# Patient Record
Sex: MALE | URBAN - METROPOLITAN AREA
[De-identification: names, ages, dates, MRNs, and addresses within clinical notes are randomized per-mention and may not be internally consistent; named-entity substitution may affect disease eponyms.]

---

## 2023-05-02 ENCOUNTER — TRANSCRIBE ORDERS (OUTPATIENT)
Dept: ADMINISTRATIVE | Age: 63
End: 2023-05-02

## 2023-05-02 DIAGNOSIS — R29.898 HEAVY SENSATION OF LOWER EXTREMITY: Primary | ICD-10-CM

## 2023-05-23 ENCOUNTER — HOSPITAL ENCOUNTER (OUTPATIENT)
Dept: VASCULAR LAB | Age: 63
Discharge: HOME | End: 2023-05-23
Payer: MEDICAID

## 2023-05-23 DIAGNOSIS — R29.898 HEAVY SENSATION OF LOWER EXTREMITY: ICD-10-CM

## 2023-05-23 PROCEDURE — 93925 LOWER EXTREMITY STUDY: CPT

## 2023-06-02 ENCOUNTER — HOSPITAL ENCOUNTER (OUTPATIENT)
Age: 63
Discharge: HOME | End: 2023-06-02
Payer: MEDICAID

## 2023-06-02 ENCOUNTER — HOSPITAL ENCOUNTER (OUTPATIENT)
Dept: GENERAL RADIOLOGY | Age: 63
Discharge: HOME | End: 2023-06-02
Payer: MEDICAID

## 2023-06-02 DIAGNOSIS — R20.0 BILATERAL LEG NUMBNESS: ICD-10-CM

## 2023-06-02 PROCEDURE — 72100 X-RAY EXAM L-S SPINE 2/3 VWS: CPT

## 2023-06-05 ENCOUNTER — OFFICE VISIT (OUTPATIENT)
Dept: FAMILY MEDICINE CLINIC | Age: 63
End: 2023-06-05
Payer: MEDICAID

## 2023-06-05 VITALS
DIASTOLIC BLOOD PRESSURE: 76 MMHG | HEIGHT: 69 IN | HEART RATE: 65 BPM | BODY MASS INDEX: 26.93 KG/M2 | WEIGHT: 181.8 LBS | OXYGEN SATURATION: 97 % | SYSTOLIC BLOOD PRESSURE: 126 MMHG

## 2023-06-05 DIAGNOSIS — M1A.0720 IDIOPATHIC CHRONIC GOUT OF LEFT FOOT WITHOUT TOPHUS: ICD-10-CM

## 2023-06-05 DIAGNOSIS — R22.41 LOCALIZED SWELLING OF RIGHT LOWER LEG: ICD-10-CM

## 2023-06-05 DIAGNOSIS — R07.9 CHEST PAIN, UNSPECIFIED TYPE: ICD-10-CM

## 2023-06-05 DIAGNOSIS — E55.9 VITAMIN D DEFICIENCY: ICD-10-CM

## 2023-06-05 DIAGNOSIS — K21.9 GASTROESOPHAGEAL REFLUX DISEASE, UNSPECIFIED WHETHER ESOPHAGITIS PRESENT: ICD-10-CM

## 2023-06-05 DIAGNOSIS — Z59.00 HOMELESSNESS: ICD-10-CM

## 2023-06-05 DIAGNOSIS — I25.118 CORONARY ARTERY DISEASE OF NATIVE ARTERY OF NATIVE HEART WITH STABLE ANGINA PECTORIS (HCC): ICD-10-CM

## 2023-06-05 DIAGNOSIS — R41.3 MEMORY PROBLEM: Primary | ICD-10-CM

## 2023-06-05 DIAGNOSIS — Z78.9 ALCOHOL USE: ICD-10-CM

## 2023-06-05 DIAGNOSIS — Z95.5 H/O HEART ARTERY STENT: ICD-10-CM

## 2023-06-05 DIAGNOSIS — E78.2 MIXED HYPERLIPIDEMIA: ICD-10-CM

## 2023-06-05 PROCEDURE — 99204 OFFICE O/P NEW MOD 45 MIN: CPT | Performed by: STUDENT IN AN ORGANIZED HEALTH CARE EDUCATION/TRAINING PROGRAM

## 2023-06-05 RX ORDER — CARVEDILOL 3.12 MG/1
3.12 TABLET ORAL 2 TIMES DAILY
COMMUNITY
Start: 2023-05-30

## 2023-06-05 RX ORDER — LANOLIN ALCOHOL/MO/W.PET/CERES
100 CREAM (GRAM) TOPICAL DAILY
COMMUNITY

## 2023-06-05 RX ORDER — ERGOCALCIFEROL 1.25 MG/1
50000 CAPSULE ORAL
COMMUNITY
End: 2023-06-05 | Stop reason: SDUPTHER

## 2023-06-05 RX ORDER — ERGOCALCIFEROL 1.25 MG/1
50000 CAPSULE ORAL
Qty: 4 CAPSULE | Status: CANCELLED | OUTPATIENT
Start: 2023-06-05

## 2023-06-05 RX ORDER — PANTOPRAZOLE SODIUM 40 MG/1
40 TABLET, DELAYED RELEASE ORAL 2 TIMES DAILY
COMMUNITY

## 2023-06-05 RX ORDER — ATORVASTATIN CALCIUM 10 MG/1
10 TABLET, FILM COATED ORAL DAILY
COMMUNITY
Start: 2023-05-08

## 2023-06-05 RX ORDER — ASPIRIN 81 MG/1
81 TABLET, COATED ORAL 2 TIMES DAILY
COMMUNITY
Start: 2023-05-30

## 2023-06-05 RX ORDER — ERGOCALCIFEROL 1.25 MG/1
50000 CAPSULE ORAL
Qty: 4 CAPSULE | Refills: 2 | Status: SHIPPED | OUTPATIENT
Start: 2023-06-05

## 2023-06-05 RX ORDER — ALLOPURINOL 100 MG/1
100 TABLET ORAL DAILY
COMMUNITY
Start: 2023-05-08

## 2023-06-05 SDOH — ECONOMIC STABILITY: FOOD INSECURITY: WITHIN THE PAST 12 MONTHS, THE FOOD YOU BOUGHT JUST DIDN'T LAST AND YOU DIDN'T HAVE MONEY TO GET MORE.: OFTEN TRUE

## 2023-06-05 SDOH — ECONOMIC STABILITY - HOUSING INSECURITY: HOMELESSNESS UNSPECIFIED: Z59.00

## 2023-06-05 SDOH — ECONOMIC STABILITY: HOUSING INSECURITY
IN THE LAST 12 MONTHS, WAS THERE A TIME WHEN YOU DID NOT HAVE A STEADY PLACE TO SLEEP OR SLEPT IN A SHELTER (INCLUDING NOW)?: YES

## 2023-06-05 SDOH — ECONOMIC STABILITY: INCOME INSECURITY: HOW HARD IS IT FOR YOU TO PAY FOR THE VERY BASICS LIKE FOOD, HOUSING, MEDICAL CARE, AND HEATING?: VERY HARD

## 2023-06-05 SDOH — ECONOMIC STABILITY: FOOD INSECURITY: WITHIN THE PAST 12 MONTHS, YOU WORRIED THAT YOUR FOOD WOULD RUN OUT BEFORE YOU GOT MONEY TO BUY MORE.: OFTEN TRUE

## 2023-06-05 ASSESSMENT — PATIENT HEALTH QUESTIONNAIRE - PHQ9
3. TROUBLE FALLING OR STAYING ASLEEP: 3
SUM OF ALL RESPONSES TO PHQ9 QUESTIONS 1 & 2: 6
SUM OF ALL RESPONSES TO PHQ QUESTIONS 1-9: 18
2. FEELING DOWN, DEPRESSED OR HOPELESS: 3
7. TROUBLE CONCENTRATING ON THINGS, SUCH AS READING THE NEWSPAPER OR WATCHING TELEVISION: 3
5. POOR APPETITE OR OVEREATING: 0
6. FEELING BAD ABOUT YOURSELF - OR THAT YOU ARE A FAILURE OR HAVE LET YOURSELF OR YOUR FAMILY DOWN: 3
10. IF YOU CHECKED OFF ANY PROBLEMS, HOW DIFFICULT HAVE THESE PROBLEMS MADE IT FOR YOU TO DO YOUR WORK, TAKE CARE OF THINGS AT HOME, OR GET ALONG WITH OTHER PEOPLE: 3
9. THOUGHTS THAT YOU WOULD BE BETTER OFF DEAD, OR OF HURTING YOURSELF: 0
4. FEELING TIRED OR HAVING LITTLE ENERGY: 3
SUM OF ALL RESPONSES TO PHQ QUESTIONS 1-9: 18
SUM OF ALL RESPONSES TO PHQ QUESTIONS 1-9: 18
1. LITTLE INTEREST OR PLEASURE IN DOING THINGS: 3
SUM OF ALL RESPONSES TO PHQ QUESTIONS 1-9: 18
8. MOVING OR SPEAKING SO SLOWLY THAT OTHER PEOPLE COULD HAVE NOTICED. OR THE OPPOSITE, BEING SO FIGETY OR RESTLESS THAT YOU HAVE BEEN MOVING AROUND A LOT MORE THAN USUAL: 0

## 2023-06-05 ASSESSMENT — ENCOUNTER SYMPTOMS
BLOOD IN STOOL: 0
RHINORRHEA: 0
SHORTNESS OF BREATH: 0
COUGH: 0

## 2023-06-05 NOTE — PROGRESS NOTES
[facility]  Establish care visit   2023    Keara Carrillo (:  1960) is a 63 y.o. male, here to establish care. Chief Complaint   Patient presents with    New Patient     Would like to establish care         ASSESSMENT/ PLAN  1. Memory problem  -Labs to rule out delirium. Referral to neurology for further evaluation and treatment. Patient has a history of alcohol abuse in the past and a history of thiamine deficiency. He has been taking thiamine. We will recheck Rosie Cool [facility], [facility]  - CBC with Auto Differential; Future  - Comprehensive Metabolic Panel; Future  - TSH with Reflex; Future  - LIPID PANEL; Future  - VITAMIN B1; Future  - Vitamin D 25 Hydroxy; Future  - Hemoglobin A1C; Future    2. H/O heart artery stent  -Referral to cardiology  - [facility]  - CBC with Auto Differential; Future  - Comprehensive Metabolic Panel; Future  - TSH with Reflex; Future  - LIPID PANEL; Future  - VITAMIN B1; Future  - Vitamin D 25 Hydroxy; Future  - Hemoglobin A1C; Future    3. Coronary artery disease of native artery of native heart with stable angina pectoris [facility])  -Referral to cardiology, continue beta-blocker, ASA, and Brilinta. - [facility]  - CBC with Auto Differential; Future  - Comprehensive Metabolic Panel; Future  - TSH with Reflex; Future  - LIPID PANEL; Future  - VITAMIN B1; Future  - Vitamin D 25 Hydroxy; Future  - Hemoglobin A1C; Future    4. Chest pain, unspecified type  -Referral to cardiology. No current chest pain. He does have chest pain with minimal exertion.  - [facility]  - CBC with Auto Differential; Future  - Comprehensive Metabolic Panel; Future  - TSH with Reflex; Future  - LIPID PANEL; Future  - VITAMIN B1; Future  - Vitamin D 25 Hydroxy; Future  - Hemoglobin A1C; Future    5.

## 2023-06-06 ENCOUNTER — CLINICAL DOCUMENTATION (OUTPATIENT)
Dept: SPIRITUAL SERVICES | Age: 63
End: 2023-06-06

## 2023-06-19 ENCOUNTER — OFFICE VISIT (OUTPATIENT)
Dept: FAMILY MEDICINE CLINIC | Age: 63
End: 2023-06-19
Payer: MEDICAID

## 2023-06-19 VITALS
WEIGHT: 181.2 LBS | DIASTOLIC BLOOD PRESSURE: 80 MMHG | SYSTOLIC BLOOD PRESSURE: 124 MMHG | HEIGHT: 69 IN | OXYGEN SATURATION: 97 % | BODY MASS INDEX: 26.84 KG/M2 | HEART RATE: 74 BPM

## 2023-06-19 DIAGNOSIS — K21.9 GASTROESOPHAGEAL REFLUX DISEASE, UNSPECIFIED WHETHER ESOPHAGITIS PRESENT: ICD-10-CM

## 2023-06-19 DIAGNOSIS — E55.9 VITAMIN D DEFICIENCY: ICD-10-CM

## 2023-06-19 DIAGNOSIS — R41.3 MEMORY PROBLEM: Primary | ICD-10-CM

## 2023-06-19 DIAGNOSIS — Z59.00 HOMELESSNESS: ICD-10-CM

## 2023-06-19 DIAGNOSIS — Z12.11 SCREENING FOR COLON CANCER: ICD-10-CM

## 2023-06-19 DIAGNOSIS — Z78.9 ALCOHOL USE: ICD-10-CM

## 2023-06-19 PROCEDURE — 99214 OFFICE O/P EST MOD 30 MIN: CPT | Performed by: STUDENT IN AN ORGANIZED HEALTH CARE EDUCATION/TRAINING PROGRAM

## 2023-06-19 RX ORDER — LISINOPRIL 2.5 MG/1
TABLET ORAL
COMMUNITY
Start: 2023-05-30

## 2023-06-19 RX ORDER — PANTOPRAZOLE SODIUM 40 MG/1
40 TABLET, DELAYED RELEASE ORAL DAILY
Qty: 90 TABLET | Refills: 0 | Status: SHIPPED | OUTPATIENT
Start: 2023-06-19

## 2023-06-19 RX ORDER — MULTIVIT-MIN/IRON/FOLIC ACID/K 18-600-40
CAPSULE ORAL
COMMUNITY
End: 2023-06-19

## 2023-06-19 RX ORDER — ERGOCALCIFEROL 1.25 MG/1
50000 CAPSULE ORAL
Qty: 4 CAPSULE | Refills: 2 | Status: SHIPPED | OUTPATIENT
Start: 2023-06-19

## 2023-06-19 RX ORDER — LANOLIN ALCOHOL/MO/W.PET/CERES
100 CREAM (GRAM) TOPICAL DAILY
Qty: 90 TABLET | Refills: 0 | Status: SHIPPED | OUTPATIENT
Start: 2023-06-19

## 2023-06-19 SDOH — ECONOMIC STABILITY - HOUSING INSECURITY: HOMELESSNESS UNSPECIFIED: Z59.00

## 2023-06-19 ASSESSMENT — ENCOUNTER SYMPTOMS
SHORTNESS OF BREATH: 0
RHINORRHEA: 1
COUGH: 0

## 2023-06-19 NOTE — PROGRESS NOTES
[facility]  2023    Norma Velez (:  1960) is a 63 y.o. male, here for evaluation of the following medical concerns:    Chief Complaint   Patient presents with    Follow-up     Mood f/u. Having a hard time remembering everything. ASSESSMENT/ PLAN  1. Memory problem  -Has appointment with neurology in 3 days. Has not had his delirium versus dementia labs drawn yet, he reports he will get these done when he is at [facility] in 3 days at his neurology appointment. 2. Homelessness  -Referral to care manager was given for resources. 3. Gastroesophageal reflux disease, unspecified whether esophagitis present  -Continue protonix. Referral to GI    4. Alcohol use  -Check thiamine, continue thiamine po    5. Vitamin D deficiency  -Continue vitamin D, check labs    6. Screening for colon cancer  -Referral to GI  - [facility] - Sharilyn Bloch, MD, Gastroenterology (ERCP & EUS), [facility]       Return in about 1 month (around 2023) for Annual Physical.    HPI  Patient is here for follow-up. He feels like his memory issues are worsening. He was not able to remember to go to [facility] to the outpatient lab to have his labs drawn. He is still sleeping on acquaintances couches and has not found any shelters or resources. He does not remember if he got a call from our care coordinator or ACP specialist.  He is having trouble remembering his appointments. He was able to get an appointment with neurology on  at [facility].  He will have labs drawn at that time. He will write down these appointments so he can remember to go. He is doing well on pantoprazole without recent heartburn. He is taking his vitamin D and thiamine but he is out and will need a refill. .    ROS  Review of Systems   Constitutional:  Negative for chills and fever. HENT:  Positive for rhinorrhea. Respiratory:  Negative for cough and shortness of breath.

## 2023-06-22 ENCOUNTER — OFFICE VISIT (OUTPATIENT)
Age: 63
End: 2023-06-22

## 2023-06-22 ENCOUNTER — HOSPITAL ENCOUNTER (OUTPATIENT)
Age: 63
Discharge: HOME | End: 2023-06-22
Payer: MEDICAID

## 2023-06-22 ENCOUNTER — CARE COORDINATION (OUTPATIENT)
Dept: CARE COORDINATION | Age: 63
End: 2023-06-22

## 2023-06-22 VITALS
DIASTOLIC BLOOD PRESSURE: 78 MMHG | HEIGHT: 69 IN | WEIGHT: 181 LBS | HEART RATE: 81 BPM | BODY MASS INDEX: 26.81 KG/M2 | OXYGEN SATURATION: 98 % | SYSTOLIC BLOOD PRESSURE: 134 MMHG

## 2023-06-22 DIAGNOSIS — E55.9 VITAMIN D DEFICIENCY: ICD-10-CM

## 2023-06-22 DIAGNOSIS — R41.3 MEMORY LOSS: Primary | ICD-10-CM

## 2023-06-22 DIAGNOSIS — E78.2 MIXED HYPERLIPIDEMIA: ICD-10-CM

## 2023-06-22 DIAGNOSIS — Z95.5 H/O HEART ARTERY STENT: ICD-10-CM

## 2023-06-22 DIAGNOSIS — K21.9 GASTROESOPHAGEAL REFLUX DISEASE, UNSPECIFIED WHETHER ESOPHAGITIS PRESENT: ICD-10-CM

## 2023-06-22 DIAGNOSIS — R41.3 MEMORY PROBLEM: ICD-10-CM

## 2023-06-22 DIAGNOSIS — R07.9 CHEST PAIN, UNSPECIFIED TYPE: ICD-10-CM

## 2023-06-22 DIAGNOSIS — Z78.9 ALCOHOL USE: ICD-10-CM

## 2023-06-22 DIAGNOSIS — I25.118 CORONARY ARTERY DISEASE OF NATIVE ARTERY OF NATIVE HEART WITH STABLE ANGINA PECTORIS (HCC): ICD-10-CM

## 2023-06-22 DIAGNOSIS — M1A.0720 IDIOPATHIC CHRONIC GOUT OF LEFT FOOT WITHOUT TOPHUS: ICD-10-CM

## 2023-06-22 DIAGNOSIS — I69.319 CVA, OLD, COGNITIVE DEFICITS: ICD-10-CM

## 2023-06-22 DIAGNOSIS — G25.0 ESSENTIAL TREMOR: ICD-10-CM

## 2023-06-22 LAB
25(OH)D3 SERPL-MCNC: 25 NG/ML
ALBUMIN SERPL-MCNC: 4.5 G/DL (ref 3.4–5)
ALBUMIN/GLOB SERPL: 2 {RATIO} (ref 1.1–2.2)
ALP SERPL-CCNC: 58 U/L (ref 40–129)
ALT SERPL-CCNC: 10 U/L (ref 10–40)
ANION GAP SERPL CALCULATED.3IONS-SCNC: 16 MMOL/L (ref 3–16)
AST SERPL-CCNC: 15 U/L (ref 15–37)
BASOPHILS # BLD: 0 K/UL (ref 0–0.2)
BASOPHILS NFR BLD: 0.5 %
BILIRUB SERPL-MCNC: <0.2 MG/DL (ref 0–1)
BUN SERPL-MCNC: 12 MG/DL (ref 7–20)
CALCIUM SERPL-MCNC: 10.3 MG/DL (ref 8.3–10.6)
CHLORIDE SERPL-SCNC: 104 MMOL/L (ref 99–110)
CHOLEST SERPL-MCNC: 162 MG/DL (ref 0–199)
CO2 SERPL-SCNC: 21 MMOL/L (ref 21–32)
CREAT SERPL-MCNC: 1.2 MG/DL (ref 0.8–1.3)
DEPRECATED RDW RBC AUTO: 12.7 % (ref 12.4–15.4)
EOSINOPHIL # BLD: 0.1 K/UL (ref 0–0.6)
EOSINOPHIL NFR BLD: 1.9 %
FOLATE SERPL-MCNC: 10.9 NG/ML (ref 4.78–24.2)
GFR SERPLBLD CREATININE-BSD FMLA CKD-EPI: >60 ML/MIN/{1.73_M2}
GLUCOSE SERPL-MCNC: 109 MG/DL (ref 70–99)
HCT VFR BLD AUTO: 41.6 % (ref 40.5–52.5)
HDLC SERPL-MCNC: 56 MG/DL (ref 40–60)
HGB BLD-MCNC: 14.6 G/DL (ref 13.5–17.5)
LDLC SERPL CALC-MCNC: 66 MG/DL
LYMPHOCYTES # BLD: 2.1 K/UL (ref 1–5.1)
LYMPHOCYTES NFR BLD: 30.1 %
MCH RBC QN AUTO: 33.6 PG (ref 26–34)
MCHC RBC AUTO-ENTMCNC: 35 G/DL (ref 31–36)
MCV RBC AUTO: 95.8 FL (ref 80–100)
MONOCYTES # BLD: 0.8 K/UL (ref 0–1.3)
MONOCYTES NFR BLD: 10.7 %
NEUTROPHILS # BLD: 4 K/UL (ref 1.7–7.7)
NEUTROPHILS NFR BLD: 56.8 %
PLATELET # BLD AUTO: 230 K/UL (ref 135–450)
PMV BLD AUTO: 8 FL (ref 5–10.5)
POTASSIUM SERPL-SCNC: 4.7 MMOL/L (ref 3.5–5.1)
PROT SERPL-MCNC: 6.8 G/DL (ref 6.4–8.2)
RBC # BLD AUTO: 4.34 M/UL (ref 4.2–5.9)
SODIUM SERPL-SCNC: 141 MMOL/L (ref 136–145)
TRIGL SERPL-MCNC: 198 MG/DL (ref 0–150)
TSH SERPL DL<=0.005 MIU/L-ACNC: 1.29 UIU/ML (ref 0.27–4.2)
URATE SERPL-MCNC: 8.1 MG/DL (ref 3.5–7.2)
VIT B12 SERPL-MCNC: <150 PG/ML (ref 211–911)
VLDLC SERPL CALC-MCNC: 40 MG/DL
WBC # BLD AUTO: 7 K/UL (ref 4–11)

## 2023-06-22 PROCEDURE — 80053 COMPREHEN METABOLIC PANEL: CPT

## 2023-06-22 PROCEDURE — 85025 COMPLETE CBC W/AUTO DIFF WBC: CPT

## 2023-06-22 PROCEDURE — 84425 ASSAY OF VITAMIN B-1: CPT

## 2023-06-22 PROCEDURE — 36415 COLL VENOUS BLD VENIPUNCTURE: CPT

## 2023-06-22 PROCEDURE — 84443 ASSAY THYROID STIM HORMONE: CPT

## 2023-06-22 PROCEDURE — 80061 LIPID PANEL: CPT

## 2023-06-22 PROCEDURE — 82306 VITAMIN D 25 HYDROXY: CPT

## 2023-06-22 PROCEDURE — 82607 VITAMIN B-12: CPT

## 2023-06-22 PROCEDURE — 82746 ASSAY OF FOLIC ACID SERUM: CPT

## 2023-06-22 PROCEDURE — 84550 ASSAY OF BLOOD/URIC ACID: CPT

## 2023-06-22 PROCEDURE — 83036 HEMOGLOBIN GLYCOSYLATED A1C: CPT

## 2023-06-22 RX ORDER — ATORVASTATIN CALCIUM 20 MG/1
20 TABLET, FILM COATED ORAL DAILY
COMMUNITY
Start: 2023-05-23

## 2023-06-22 NOTE — PATIENT INSTRUCTIONS
EEG PREP:  1. Patient should take seizure medicine, as prescribed, on the day of the test  2. Patient must NOT have more than 5 hours of sleep prior to the EEG  3. Patient should have CLEAN hair, no hairspray, gel, cream rinse, hair pins, or chemical treatments within 48 hours prior to EEG  4. NO caffeine, pain medications or sedatives on the day of the test (TAKE SEIZURE MEDS!)  5. Arrive 30 minutes prior to appointment time (check in at Registration Desk on 1st floor of hospital main entrance - by the [employer] shop)  6. Procedure will last 60-90 minutes.

## 2023-06-22 NOTE — CARE COORDINATION
Ambulatory Care Coordination Note  2023    Patient Current Location: [facility]    ACM contacted the patient by telephone. Verified name and  with patient as identifiers. Provided introduction to self, and explanation of the ACM role. ACM: Stephen Barrientos RN    ACM outreach to patient to touch base per PCP referral. Patient was explained the reason for the call and introduced to CC and the role of the ACM. Patient is accepting to follow up calls. Patient is a 63 y.o. male who is homeless staying with friends. Patient notes that he has a lot of trouble remembering when his appointments are and often forgets to attend. ACM reminded him he had an appointment today at [facility]. Patient VU. Patient notes he recently got medicaid, believes a month ago. Patient is not sure if he has a CM and if he does he does not know who they are. Patient does not remember if he has completed application for SNAP or any financial assistance. Patient has not applied for any low income or reduced income housing. Patient is not aware of shelters in the area. Patient notes he has a license but no vehicle. Uses transportation services to get to appointments. Patient is new to the PCP, per notes patient has a hx of CAD, alcoholism, gout, hyperlipidemia. These are not listed in the patients problem list, just in provider notes. Patient admits to drinking a few beers a day. ACM encouraged him to limit alcohol and suggested support groups. Patient declined support groups but VU on limiting alcohol. Patient denies any CP, pressure, SOB, swelling at this time. Patient is a poor historian. Notes he had an MI and has a stent. He is scheduled to follow up with cardiology in August.    Patient has many social issues which will be referred to SW to discuss. ACM encouraged patient to get [employer] so resources can be sent that way d/t having no permanent address. Patient VU.    Patient again reminded of his upcoming appointments

## 2023-06-22 NOTE — PROGRESS NOTES
study to review at this visit. Impression:      ICD-10-CM    1. Memory loss  R41.3 EEG Extended More Than 1 Hour        The patient has borderline MMSE. However, this may be normal based on his education. Due to the patient has significant impact from his memory difficulty, the patient needs further medication to exclude dementia or alternative diagnosis. Plan:    1. EEG. 2.  MRI brain without contrast.  3.  B12 and folate level. 4.  Follow-up after the tests. Plan discussed with patient who voiced understanding and agreed with the plan. Portions of this note were created using voice recognition software, please excuse any typos.       Alejandrina Mayer MD

## 2023-06-23 ENCOUNTER — CARE COORDINATION (OUTPATIENT)
Dept: CARE COORDINATION | Age: 63
End: 2023-06-23

## 2023-06-23 LAB
EST. AVERAGE GLUCOSE BLD GHB EST-MCNC: 102.5 MG/DL
HBA1C MFR BLD: 5.2 %

## 2023-06-23 RX ORDER — ALLOPURINOL 200 MG/1
200 TABLET ORAL DAILY
Qty: 30 TABLET | Refills: 0 | Status: SHIPPED | OUTPATIENT
Start: 2023-06-23 | End: 2023-07-23

## 2023-06-23 NOTE — CARE COORDINATION
SW received referral from [facility] for assistance with community resource needs. SW placed call to patient; left voicemail and requested return call. Will continue to follow accordingly.      Joshua BAL, [facility]     [phone]

## 2023-06-27 LAB — VIT B1 SERPL-MCNC: 8 NMOL/L (ref 4–15)

## 2023-06-28 ENCOUNTER — CARE COORDINATION (OUTPATIENT)
Dept: CARE COORDINATION | Age: 63
End: 2023-06-28

## 2023-06-28 ENCOUNTER — HOSPITAL ENCOUNTER (OUTPATIENT)
Dept: MRI IMAGING | Age: 63
Discharge: HOME | End: 2023-06-28
Attending: PSYCHIATRY & NEUROLOGY
Payer: MEDICAID

## 2023-06-28 DIAGNOSIS — R41.3 MEMORY LOSS: ICD-10-CM

## 2023-06-28 PROCEDURE — 70551 MRI BRAIN STEM W/O DYE: CPT

## 2023-06-30 ENCOUNTER — CARE COORDINATION (OUTPATIENT)
Dept: CARE COORDINATION | Age: 63
End: 2023-06-30

## 2023-07-03 ENCOUNTER — CARE COORDINATION (OUTPATIENT)
Dept: CARE COORDINATION | Age: 63
End: 2023-07-03

## 2023-07-03 NOTE — CARE COORDINATION
SW placed follow-up call to patient. Patient stated he has not called [employer] for information on hotel waitlist. He apologized for not remembering conversation and this worker provided him with their contact information again. SW also discussed Advance Directives and he is interested in seeing paperwork and will think about who he would name as his decision-maker. SW to send mail request to [address] [place] [address] on this date. Patient also stated he has an appointment with [facility] on 7/10 at 930 AM and is going to call transportation this morning to set up a ride. SW to follow-up with patient next week.       Denis Curry MSW, [facility]     [phone]

## 2023-07-12 ENCOUNTER — CARE COORDINATION (OUTPATIENT)
Dept: CARE COORDINATION | Age: 63
End: 2023-07-12

## 2023-07-12 NOTE — CARE COORDINATION
SW placed follow-up call to patient to ensure receipt of mailed AD packet. Patient stated the mail is at his sister's house in a locked mailbox and he has not yet had a chance to retrieve mail due to sister being out of town. He will check today to see if paperwork is there as she returns home today. Patient agreeable to follow-up call next week.      Marci BAL, [place]     [phone]

## 2023-07-14 ENCOUNTER — CARE COORDINATION (OUTPATIENT)
Dept: CASE MANAGEMENT | Age: 63
End: 2023-07-14

## 2023-07-14 NOTE — CARE COORDINATION
LPN CC attempted to reach patient for care coordination. Unable to reach patient. Left detailed VM with reason for call & contact information.    Christiansen Carrier, LPN [facility]  Care Transitions  [phone]

## 2023-07-17 ENCOUNTER — OFFICE VISIT (OUTPATIENT)
Age: 63
End: 2023-07-17
Payer: MEDICAID

## 2023-07-17 ENCOUNTER — HOSPITAL ENCOUNTER (OUTPATIENT)
Dept: NEUROLOGY | Age: 63
Discharge: HOME | End: 2023-07-17
Payer: MEDICAID

## 2023-07-17 VITALS
OXYGEN SATURATION: 97 % | WEIGHT: 183 LBS | BODY MASS INDEX: 27.11 KG/M2 | DIASTOLIC BLOOD PRESSURE: 64 MMHG | SYSTOLIC BLOOD PRESSURE: 126 MMHG | HEIGHT: 69 IN | HEART RATE: 59 BPM

## 2023-07-17 DIAGNOSIS — R41.3 MEMORY LOSS: ICD-10-CM

## 2023-07-17 DIAGNOSIS — E53.8 B12 DEFICIENCY: Primary | ICD-10-CM

## 2023-07-17 PROCEDURE — 95813 EEG EXTND MNTR 61-119 MIN: CPT

## 2023-07-17 PROCEDURE — 99204 OFFICE O/P NEW MOD 45 MIN: CPT | Performed by: PSYCHIATRY & NEUROLOGY

## 2023-07-17 NOTE — PROGRESS NOTES
Neurology outpatient F/U visit    Patient name: Mildred Rogel      Chief Complaint:  Memory loss. History of present illness: This is 63years old right-handed male. The patient is here for evaluation of memory loss. The onset was about 10 months ago after the patient had coronary artery disease. The patient has more short-term memory difficulty the long-term memory difficulty. The patient reports significant impact from his memory difficulty. The patient denies history of seizure or CVA. The patient is also noted for chronic smoking and chronic alcohol use. The patient is pending for thiamine level, TSH, and vitamin D level. The patient denies significant mood disorder or depression. The patient never had brain imaging done. Education: The patient almost finished GED. MMSE 25/30. Orientation to time 4/5, place 5/5, registration 3/3, recall 2/3, naming 1/2, drawing 0/1, and calculation 4/5. Interval history:  07/17/23: The patient is here for follow-up after the first tier of investigations for memory loss. Unfortunately, the brain MRI showed mild to moderate brain atrophy with significant white matter changes. The EEG showed no evidence of seizure tendency. The patient also has significantly low B12 level. The patient also complains of nonspecific abnormal sensation on both legs at this time. Past medical history:  Coronary artery disease.     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

## 2023-07-18 ENCOUNTER — CARE COORDINATION (OUTPATIENT)
Dept: CARE COORDINATION | Age: 63
End: 2023-07-18

## 2023-07-18 NOTE — CARE COORDINATION
[facility] received return call from patient. He stated he has not yet retrieved paperwork from [address] [place] [address]. Patient inquired again why this paperwork was needed and SW explained that it is optional, but helpful to have in the event he cannot make his medical decisions. Patient inquired about help with coordinating application process for SSDI application. He stated the application was started in [place] and information was transferred to [place]. He stated he has information for the MD offices that they requested, but has not yet been to all of his appointments due to having to schedule them so far out. Patient stated he feels he needs a  and SW encouraged him to contact [address] [place] [address],[address] [phone]. No further needs at this time, will sign off.       820  SW placed follow-up call to patient to inquire about receipt of AD packet. Left voicemail and requested return call. Will continue to follow accordingly.     Orla Lon BAL, [facility]     [phone]

## 2023-07-20 ENCOUNTER — OFFICE VISIT (OUTPATIENT)
Dept: FAMILY MEDICINE CLINIC | Age: 63
End: 2023-07-20
Payer: MEDICAID

## 2023-07-20 VITALS
WEIGHT: 182 LBS | BODY MASS INDEX: 26.96 KG/M2 | HEART RATE: 77 BPM | HEIGHT: 69 IN | OXYGEN SATURATION: 97 % | DIASTOLIC BLOOD PRESSURE: 82 MMHG | SYSTOLIC BLOOD PRESSURE: 126 MMHG

## 2023-07-20 DIAGNOSIS — M1A.0720 IDIOPATHIC CHRONIC GOUT OF LEFT FOOT WITHOUT TOPHUS: Primary | ICD-10-CM

## 2023-07-20 DIAGNOSIS — Z78.9 ALCOHOL USE: ICD-10-CM

## 2023-07-20 DIAGNOSIS — E53.8 B12 DEFICIENCY: ICD-10-CM

## 2023-07-20 DIAGNOSIS — M1A.09X0 CHRONIC GOUT OF MULTIPLE SITES, UNSPECIFIED CAUSE: ICD-10-CM

## 2023-07-20 PROCEDURE — 99214 OFFICE O/P EST MOD 30 MIN: CPT | Performed by: STUDENT IN AN ORGANIZED HEALTH CARE EDUCATION/TRAINING PROGRAM

## 2023-07-20 PROCEDURE — 36415 COLL VENOUS BLD VENIPUNCTURE: CPT | Performed by: STUDENT IN AN ORGANIZED HEALTH CARE EDUCATION/TRAINING PROGRAM

## 2023-07-20 ASSESSMENT — ENCOUNTER SYMPTOMS
BLOOD IN STOOL: 0
RHINORRHEA: 0
SHORTNESS OF BREATH: 0
COUGH: 0

## 2023-07-21 LAB — URATE SERPL-MCNC: 8.1 MG/DL (ref 3.5–7.2)

## 2023-07-21 RX ORDER — ALLOPURINOL 300 MG/1
300 TABLET ORAL DAILY
Qty: 90 TABLET | Refills: 1 | Status: SHIPPED | OUTPATIENT
Start: 2023-07-21

## 2023-07-25 ENCOUNTER — CARE COORDINATION (OUTPATIENT)
Dept: CARE COORDINATION | Age: 63
End: 2023-07-25

## 2023-07-25 NOTE — CARE COORDINATION
SW received request from [facility] to complete another outreach to patient as he has questions about AL facilities. SW placed call to patient and he requested to call this worker back at a better time. Ensured patient has this worker's number. Will continue to follow accordingly.      Karthikeyan BAL, [place]     [phone]

## 2023-07-27 ENCOUNTER — CARE COORDINATION (OUTPATIENT)
Dept: CARE COORDINATION | Age: 63
End: 2023-07-27

## 2023-07-27 DIAGNOSIS — R41.89 NEUROCOGNITIVE DEFICITS: Primary | ICD-10-CM

## 2023-07-27 DIAGNOSIS — R29.818 NEUROCOGNITIVE DEFICITS: Primary | ICD-10-CM

## 2023-07-27 NOTE — CARE COORDINATION
SW placed call and spoke to patient regarding AL facilities and housing. SW explained that AL is paid for out of pocket; there is a waiver program for AL, but he would have to be able to pay room and board and meet ADL requirements. Due to patient having zero income, this would not be possible at this time. Patient confirmed he had phone appointment with social security on 7/18; he understands it can take 3 months for them to process everything. Patient stated his niece applied for the [facility] section 8 waitlist; SW explained that he will not immediately get a voucher for housing. It could take years before he could get one due to demand for housing at this time. SW encouraged patient to contact the CAP line 381-SAFE for the [facility] shelter system to see if he could be accepted to a shelter/housing program that could help him into housing. He will call after ending call with this worker. Patient also stated he was recommended to contact a senior companionship program to get daily support; he left them a message but has not yet heard back. He cannot recall the name of the organization. Patient expressed he is feeling more fatigued and having trouble walking. SW asked if he would want to be evaluated by home PT and he replied he does not know what he needs. Will collaborate with [facility] on this. SW asked for patient to call this worker back after speaking to the CAP line. Will continue to follow.     Kerry BAL, [place]     [phone]

## 2023-07-28 NOTE — CARE COORDINATION
We received a fax from [address] [facility] stating they cannot accept patient at this time due to \"timeliness of carem areas of coverage and insurance requirements\"    Denial scanned to patients media

## 2023-07-31 ENCOUNTER — CARE COORDINATION (OUTPATIENT)
Dept: CARE COORDINATION | Age: 63
End: 2023-07-31

## 2023-07-31 NOTE — CARE COORDINATION
1475  [facility] Agencies that are in network with insurance are:    [facility] unable to accept insurance. [address]  375 [facility], [address] 7327 [phone]  500 [facility] AGENCY  Accepting New Patients: YES    125 [facility] to accept new referrals. [address] 406 [facility], [address] 1720  [phone]  [facility] AGENCY  Accepting New Patients: YES    [facility]- Reports unable to accept referral.  [address] JESSICA [address], [address] 3323  ([facility]  Accepting New Patients: YES    [facility]- Reports unable to accept referral.  205 [facility] 1317 [facility] 2  375 [facility], [address] [facility] [address] 1482  [phone]  [address]  Accepting New Patients: [address]; INC- Reports unable to accept referral.  [address]  [address], [address] 8663  ([address]  Accepting New Patients: [facility] does not offer skilled services. [address] [facility], [address]  [phone]   [address]  Accepting New Patients: YES    [facility] 2500 [facility]- Reports unable to accept insurance. [facility], [address] 0785  ([facility]  Accepting New Patients: [address] [facility] [address]- Reports unable to accept PT referral without SN services, reports too hard to manage. 575 [facility], 1650 [facility] 1058  ([address] [place] [address] Dr  Accepting New Patients: YES    [facility] 2500 [facility]- Left message requesting a return call.    1200 [facility]

## 2023-07-31 NOTE — CARE COORDINATION
SW placed follow-up call to patient to discuss outcome of call to the CAP line. Patient stated he left them a voicemail on Thursday. SW encouraged him to call again to try to reach intake; he stated he will do so. He is agreeable for this worker to call later this week. Will remain available if patient needs to speak to this worker before then.     Dillon File MSW, [facility] [place]     [phone]

## 2023-07-31 NOTE — CARE COORDINATION
Call back to [facility] to see if they could accept for SN & PT since unable to accept for PT services only. Staff member reports that they are unable to accept due to not having enough availability in pt's area. Navdeep unable to accept PT referral without SN services, reports too hard to manage.    [address], 3887 [facility] 0986  ([address] [place] [address] Dr  Accepting New Patients: YES

## 2023-08-01 ENCOUNTER — HOSPITAL ENCOUNTER (OUTPATIENT)
Age: 63
Discharge: HOME | End: 2023-08-01
Payer: MEDICAID

## 2023-08-01 ENCOUNTER — OFFICE VISIT (OUTPATIENT)
Dept: CARDIOLOGY CLINIC | Age: 63
End: 2023-08-01
Payer: MEDICAID

## 2023-08-01 VITALS
HEART RATE: 64 BPM | HEIGHT: 69 IN | BODY MASS INDEX: 27.22 KG/M2 | SYSTOLIC BLOOD PRESSURE: 130 MMHG | OXYGEN SATURATION: 98 % | DIASTOLIC BLOOD PRESSURE: 78 MMHG | WEIGHT: 183.8 LBS

## 2023-08-01 DIAGNOSIS — E78.2 MIXED HYPERLIPIDEMIA: ICD-10-CM

## 2023-08-01 DIAGNOSIS — E53.9 VITAMIN B DEFICIENCY: ICD-10-CM

## 2023-08-01 DIAGNOSIS — I25.10 CORONARY ARTERY DISEASE INVOLVING NATIVE CORONARY ARTERY OF NATIVE HEART, UNSPECIFIED WHETHER ANGINA PRESENT: ICD-10-CM

## 2023-08-01 DIAGNOSIS — E88.9 PERIPHERAL NEUROPATHY DUE TO DISORDER OF METABOLISM (HCC): ICD-10-CM

## 2023-08-01 DIAGNOSIS — I25.10 CORONARY ARTERY DISEASE INVOLVING NATIVE CORONARY ARTERY OF NATIVE HEART WITHOUT ANGINA PECTORIS: Primary | ICD-10-CM

## 2023-08-01 DIAGNOSIS — G63 PERIPHERAL NEUROPATHY DUE TO DISORDER OF METABOLISM (HCC): ICD-10-CM

## 2023-08-01 DIAGNOSIS — E53.8 VITAMIN B12 DEFICIENCY: ICD-10-CM

## 2023-08-01 LAB
FOLATE SERPL-MCNC: 11.79 NG/ML (ref 4.78–24.2)
VIT B12 SERPL-MCNC: 502 PG/ML (ref 211–911)

## 2023-08-01 PROCEDURE — 93000 ELECTROCARDIOGRAM COMPLETE: CPT | Performed by: INTERNAL MEDICINE

## 2023-08-01 PROCEDURE — 82607 VITAMIN B-12: CPT

## 2023-08-01 PROCEDURE — 36415 COLL VENOUS BLD VENIPUNCTURE: CPT

## 2023-08-01 PROCEDURE — 99244 OFF/OP CNSLTJ NEW/EST MOD 40: CPT | Performed by: INTERNAL MEDICINE

## 2023-08-01 PROCEDURE — 82746 ASSAY OF FOLIC ACID SERUM: CPT

## 2023-08-01 RX ORDER — ASPIRIN 81 MG/1
81 TABLET, COATED ORAL DAILY
Qty: 90 TABLET | Refills: 3 | Status: SHIPPED | OUTPATIENT
Start: 2023-08-01

## 2023-08-01 NOTE — PATIENT INSTRUCTIONS
CAD  -s/p stent 11/20/2022  -STOP ticagrelor end of November 2023  -I recommend Limited ECHO to evaluate LV function and size, wall motion, and valves for any structural abnormalities. CALL [phone]  2. Memory loss   -continue B 12  Stop Pantoprazole as he denies stomach issues.        Plan to follow up in 3 months

## 2023-08-03 ENCOUNTER — TELEPHONE (OUTPATIENT)
Dept: CARDIOLOGY CLINIC | Age: 63
End: 2023-08-03

## 2023-08-03 ENCOUNTER — CARE COORDINATION (OUTPATIENT)
Dept: CARE COORDINATION | Age: 63
End: 2023-08-03

## 2023-08-03 NOTE — CARE COORDINATION
SW placed call to patient who stated he has not been successful in reaching the CAP line. He cites continued difficulty with his memory; SW acknowledged that this is an ongoing problem for him. SW placed call to the CAP line and left a message, requesting a return call. Will help facilitate contact between patient and CAP line to discuss shelter options.      Nunu BAL, [place]     [phone]

## 2023-08-03 NOTE — TELEPHONE ENCOUNTER
----- Message from Claudell Conch, MD sent at 8/2/2023  9:32 PM EDT -----  B12 level is ok I sent a refill on B12 to his drug store continue one a day.

## 2023-08-03 NOTE — CARE COORDINATION
SW received call back from the CAP line. Per staff, this worker can call between 7-9 AM to speak to an . Otherwise, patient will need to contact them directly. Staff also advised that all of their shelters are full at this time. SW will outreach to [facility] during above timeframe to determine options for patient.      Mirta Jeronimo MSTE, [facility]     [phone]

## 2023-08-03 NOTE — TELEPHONE ENCOUNTER
Created telephone encounter. Spoke with Kailey Lowry relayed message per [facility] regarding labs. Pt verbalized understanding.

## 2023-08-08 ENCOUNTER — CARE COORDINATION (OUTPATIENT)
Dept: CASE MANAGEMENT | Age: 63
End: 2023-08-08

## 2023-08-08 ENCOUNTER — CARE COORDINATION (OUTPATIENT)
Dept: CARE COORDINATION | Age: 63
End: 2023-08-08

## 2023-08-08 NOTE — CARE COORDINATION
LEONEL placed call to [employer] to inquire about waitlist for hotel stay. Left voicemail and requested return call. [facility] shelters are full at this time. Patient can call daily to check for openings. LEONEL placed call to patient to inform him of the aforementioned. Will continue to follow for assistance with obtaining information from [employer].      Candida BAL, [place]     [phone]

## 2023-08-08 NOTE — CARE COORDINATION
LPN CC attempted to reach patient for care coordination. Unable to reach. Left detailed VM with reason for call & contact information.   Kathaleen Kussmaul, LPN [facility] Transitions  [phone]

## 2023-08-11 ENCOUNTER — CARE COORDINATION (OUTPATIENT)
Dept: CARE COORDINATION | Age: 63
End: 2023-08-11

## 2023-08-11 NOTE — CARE COORDINATION
SW received call back from [facility] and was informed by staff that they are full at this time, therefore patient will need to call daily to inquire about openings. They are running on a first come, first serve basis and there is not a wait list.     SW placed call to patient to inform him of the aforementioned. SW provided their contact number to him again and encouraged him to call. SW to sign off at this time.     Ny BAL, [place]     [phone]

## 2023-08-15 ENCOUNTER — CARE COORDINATION (OUTPATIENT)
Dept: CARE COORDINATION | Age: 63
End: 2023-08-15

## 2023-08-22 ENCOUNTER — OFFICE VISIT (OUTPATIENT)
Dept: FAMILY MEDICINE CLINIC | Age: 63
End: 2023-08-22
Payer: MEDICAID

## 2023-08-22 VITALS
HEART RATE: 65 BPM | OXYGEN SATURATION: 97 % | SYSTOLIC BLOOD PRESSURE: 106 MMHG | WEIGHT: 182.8 LBS | DIASTOLIC BLOOD PRESSURE: 70 MMHG | BODY MASS INDEX: 27.08 KG/M2 | HEIGHT: 69 IN

## 2023-08-22 DIAGNOSIS — K21.9 GASTROESOPHAGEAL REFLUX DISEASE, UNSPECIFIED WHETHER ESOPHAGITIS PRESENT: ICD-10-CM

## 2023-08-22 DIAGNOSIS — M1A.0720 IDIOPATHIC CHRONIC GOUT OF LEFT FOOT WITHOUT TOPHUS: ICD-10-CM

## 2023-08-22 DIAGNOSIS — E53.8 B12 DEFICIENCY: Primary | ICD-10-CM

## 2023-08-22 DIAGNOSIS — R41.3 MEMORY PROBLEM: ICD-10-CM

## 2023-08-22 PROCEDURE — 99214 OFFICE O/P EST MOD 30 MIN: CPT | Performed by: STUDENT IN AN ORGANIZED HEALTH CARE EDUCATION/TRAINING PROGRAM

## 2023-08-22 ASSESSMENT — ENCOUNTER SYMPTOMS
SHORTNESS OF BREATH: 0
RHINORRHEA: 0
COUGH: 0

## 2023-08-22 NOTE — PROGRESS NOTES
[address] [place] [address]  2023    Gunner Whaley (:  1960) is a 63 y.o. male, here for evaluation of the following medical concerns:    Chief Complaint   Patient presents with    Gout     Pt is here for 1 month follow up         ASSESSMENT/ PLAN  1. B12 deficiency  -B12 was back in the normal range. Likely can take up to 2 years to realize full benefits. Has follow-up with neurology next month. Has cut down on alcohol and has only had 2 drinks in the last week. He is no longer taking thiamine. 2. Memory problem  -Memory has not improved. B12 is back in the normal range. Could take months to years to realize full benefits. Has follow-up with neurology next month. 3. Gastroesophageal reflux disease, unspecified whether esophagitis present  Has stopped taking pantoprazole and denies any return of heartburn symptoms. Will monitor for now. 4.  Idiopathic chronic gout of left foot without tophus  - Has not had any flareups since increasing allopurinol to 300 mg. He would like to hold off on rechecking uric acid for another flareup. No follow-ups on file. HPI  Patient is here for follow-up. He does not feel like memory has improved since starting B12. Has follow-up with neurology in early September. He has cut down on his drinking and has only had 2 drinks in the last week. He stopped taking his thiamine. He is declining tetanus today. He has not had any flareups of his gout since increasing his allopurinol. He stopped taking his pantoprazole and has not had any return of heartburn symptoms. He has continued numbness of his anterior thighs bilaterally. ROS  Review of Systems   Constitutional:  Negative for chills and fever. HENT:  Negative for rhinorrhea. Respiratory:  Negative for cough and shortness of breath. Neurological:  Positive for numbness.         Memory problem     HISTORIES  Current Outpatient Medications on File Prior to Visit   Medication Sig Dispense

## 2023-08-29 ENCOUNTER — CARE COORDINATION (OUTPATIENT)
Dept: CARE COORDINATION | Age: 63
End: 2023-08-29

## 2023-09-07 ENCOUNTER — HOSPITAL ENCOUNTER (OUTPATIENT)
Age: 63
Discharge: HOME | End: 2023-09-07
Payer: MEDICAID

## 2023-09-07 ENCOUNTER — HOSPITAL ENCOUNTER (OUTPATIENT)
Dept: GENERAL RADIOLOGY | Age: 63
Discharge: HOME | End: 2023-09-07
Payer: MEDICAID

## 2023-09-07 ENCOUNTER — TELEPHONE (OUTPATIENT)
Age: 63
End: 2023-09-07

## 2023-09-07 ENCOUNTER — OFFICE VISIT (OUTPATIENT)
Age: 63
End: 2023-09-07
Payer: MEDICAID

## 2023-09-07 VITALS
WEIGHT: 182 LBS | HEIGHT: 69 IN | BODY MASS INDEX: 26.96 KG/M2 | HEART RATE: 64 BPM | DIASTOLIC BLOOD PRESSURE: 60 MMHG | SYSTOLIC BLOOD PRESSURE: 116 MMHG | OXYGEN SATURATION: 96 %

## 2023-09-07 DIAGNOSIS — E53.8 B12 DEFICIENCY: ICD-10-CM

## 2023-09-07 DIAGNOSIS — M89.8X9 METABOLIC BONE DISEASE: ICD-10-CM

## 2023-09-07 DIAGNOSIS — R41.3 MEMORY LOSS: Primary | ICD-10-CM

## 2023-09-07 PROCEDURE — 73080 X-RAY EXAM OF ELBOW: CPT

## 2023-09-07 PROCEDURE — 99214 OFFICE O/P EST MOD 30 MIN: CPT | Performed by: PSYCHIATRY & NEUROLOGY

## 2023-09-07 RX ORDER — DONEPEZIL HYDROCHLORIDE 5 MG/1
5 TABLET, FILM COATED ORAL NIGHTLY
Qty: 30 TABLET | Refills: 2 | Status: SHIPPED | OUTPATIENT
Start: 2023-09-07

## 2023-09-07 NOTE — TELEPHONE ENCOUNTER
Pt asked me to call him a week or so prior to his next appt with Dr. Sonia Rocha on 11/9 to remind him to come to [facility] lab to have B12 & folate level drawn. I will postpone this encounter and call him a few days prior to remind him. I scheduled his appt with Dr. Sonia Rocha the same day as his f/u with cardiology so he can combine trips. Will remind him of that as well when I call.

## 2023-09-07 NOTE — PROGRESS NOTES
Neurology outpatient F/U visit    Patient name: Dave Brooks      Chief Complaint:  Memory loss. History of present illness: This is 63years old right-handed male. The patient is here for evaluation of memory loss. The onset was about 10 months ago after the patient had coronary artery disease. The patient has more short-term memory difficulty the long-term memory difficulty. The patient reports significant impact from his memory difficulty. The patient denies history of seizure or CVA. The patient is also noted for chronic smoking and chronic alcohol use. The patient is pending for thiamine level, TSH, and vitamin D level. The patient denies significant mood disorder or depression. The patient never had brain imaging done. Education: The patient almost finished [employer]. MMSE 25/30. Orientation to time 4/5, place 5/5, registration 3/3, recall 2/3, naming 1/2, drawing 0/1, and calculation 4/5. Interval history:  07/17/23: The patient is here for follow-up after the first tier of investigations for memory loss. Unfortunately, the brain MRI showed mild to moderate brain atrophy with significant white matter changes. The EEG showed no evidence of seizure tendency. The patient also has significantly low B12 level. The patient also complains of nonspecific abnormal sensation on both legs at this time. 09/07/23: Patient is here for follow-up memory loss. The patient remains to have significant impact from his memory difficulty. The patient had follow-up B12 level which showed about 500 last month. The patient still does not want to do injection of B12. The patient denies new focal neurological deficit today. The patient denies significant abnormal sensation on both legs at this time. Past medical history:  Coronary artery disease.     Past surgical history:    Past Surgical History:   Procedure Laterality Date    CORONARY ANGIOPLASTY WITH STENT PLACEMENT      HAND AMPUTATION THROUGH WRIST

## 2023-09-11 ENCOUNTER — ANESTHESIA EVENT (OUTPATIENT)
Dept: ENDOSCOPY | Age: 63
End: 2023-09-11
Payer: MEDICAID

## 2023-09-15 NOTE — PROGRESS NOTES
..1.  Do not eat or drink anything after 12 midnight prior to surgery. This includes no water, chewing gum or mints, except for bowel prep complete per MD.  2.  Take the following pills with a small sip of water on the morning of surgery 09/21/2023.  3.  Aspirin, Ibuprofen, Advil, Naproxen, Vitamin E and other Anti-inflammatory products should be stopped for 5 days before surgery or as directed by your physician. 4.  Check with your doctor regarding stopping Plavix, Coumadin, Lovenox, Fragmin or other blood thinners. 5.  Do not smoke and do not drink alcoholic beverages 24 hours prior to surgery. This includes NA Beer. 6.  You may brush your teeth and gargle the morning of surgery. DO NOT SWALLOW WATER.  7.  You MUST make arrangements for a responsible adult to take you home after your surgery. You will not be allowed to leave alone or drive yourself home. It is strongly suggested someone stay with you the first 24 hours. Your surgery will be cancelled if you do not have a ride home. 8.  A parent/legal guardian must accompany a child scheduled for surgery and plan to stay at the hospital until the child is discharged. Please do not bring other children with you. 9.  Please wear simple, loose fitting clothing to the hospital.  Sharl Mealy not bring valuables ( money, credit cards, checkbooks, etc.)  Do not wear any makeup (including no eye makeup) or nail polish on your fingers or toes. 10.  Do not wear any jewelry or piercing on the day of surgery. All body piercing jewelry must be removed. 11.  If you have dentures, they will be removed before going to the OR; we will provide you a container. If you wear contact lenses or glasses, they will be removed; please bring a case for them.   15.  Notify your Surgeon if you develop any illness between now and surgery time; cough, cold, fever, sore throat, nausea, vomiting, etc.  Please notify your surgeon if you experience dizziness, shortness of breath or blurred vision between now and the time of your surgery. To provide excellent care, visitors will be limited to two in a room at any given time. Please no children under the age of [address] in the surgical department.

## 2023-09-21 ENCOUNTER — HOSPITAL ENCOUNTER (OUTPATIENT)
Age: 63
Setting detail: OUTPATIENT SURGERY
Discharge: HOME | End: 2023-09-21
Attending: INTERNAL MEDICINE | Admitting: INTERNAL MEDICINE
Payer: MEDICAID

## 2023-09-21 ENCOUNTER — ANESTHESIA (OUTPATIENT)
Dept: ENDOSCOPY | Age: 63
End: 2023-09-21
Payer: MEDICAID

## 2023-09-21 VITALS
RESPIRATION RATE: 14 BRPM | OXYGEN SATURATION: 95 % | TEMPERATURE: 98.2 F | BODY MASS INDEX: 26.66 KG/M2 | SYSTOLIC BLOOD PRESSURE: 108 MMHG | WEIGHT: 180 LBS | DIASTOLIC BLOOD PRESSURE: 71 MMHG | HEIGHT: 69 IN | HEART RATE: 93 BPM

## 2023-09-21 DIAGNOSIS — Z12.11 SPECIAL SCREENING FOR MALIGNANT NEOPLASMS, COLON: ICD-10-CM

## 2023-09-21 LAB
EKG ATRIAL RATE: 73 BPM
EKG DIAGNOSIS: NORMAL
EKG P AXIS: 41 DEGREES
EKG P-R INTERVAL: 170 MS
EKG Q-T INTERVAL: 410 MS
EKG QRS DURATION: 86 MS
EKG QTC CALCULATION (BAZETT): 451 MS
EKG R AXIS: 56 DEGREES
EKG T AXIS: 62 DEGREES
EKG VENTRICULAR RATE: 73 BPM

## 2023-09-21 PROCEDURE — 3700000000 HC ANESTHESIA ATTENDED CARE: Performed by: INTERNAL MEDICINE

## 2023-09-21 PROCEDURE — 93010 ELECTROCARDIOGRAM REPORT: CPT | Performed by: INTERNAL MEDICINE

## 2023-09-21 PROCEDURE — 2709999900 HC NON-CHARGEABLE SUPPLY: Performed by: INTERNAL MEDICINE

## 2023-09-21 PROCEDURE — 3700000001 HC ADD 15 MINUTES (ANESTHESIA): Performed by: INTERNAL MEDICINE

## 2023-09-21 PROCEDURE — 2580000003 HC RX 258: Performed by: NURSE ANESTHETIST, CERTIFIED REGISTERED

## 2023-09-21 PROCEDURE — 7100000010 HC PHASE II RECOVERY - FIRST 15 MIN: Performed by: INTERNAL MEDICINE

## 2023-09-21 PROCEDURE — 6360000002 HC RX W HCPCS: Performed by: NURSE ANESTHETIST, CERTIFIED REGISTERED

## 2023-09-21 PROCEDURE — 7100000011 HC PHASE II RECOVERY - ADDTL 15 MIN: Performed by: INTERNAL MEDICINE

## 2023-09-21 PROCEDURE — 93005 ELECTROCARDIOGRAM TRACING: CPT | Performed by: ANESTHESIOLOGY

## 2023-09-21 PROCEDURE — 3609010600 HC COLONOSCOPY POLYPECTOMY SNARE/COLD BIOPSY: Performed by: INTERNAL MEDICINE

## 2023-09-21 PROCEDURE — 88305 TISSUE EXAM BY PATHOLOGIST: CPT

## 2023-09-21 PROCEDURE — 2500000003 HC RX 250 WO HCPCS: Performed by: NURSE ANESTHETIST, CERTIFIED REGISTERED

## 2023-09-21 RX ORDER — SODIUM CHLORIDE 9 MG/ML
INJECTION, SOLUTION INTRAVENOUS PRN
Status: DISCONTINUED | OUTPATIENT
Start: 2023-09-21 | End: 2023-09-21 | Stop reason: HOSPADM

## 2023-09-21 RX ORDER — SODIUM CHLORIDE 0.9 % (FLUSH) 0.9 %
5-40 SYRINGE (ML) INJECTION PRN
Status: DISCONTINUED | OUTPATIENT
Start: 2023-09-21 | End: 2023-09-21 | Stop reason: HOSPADM

## 2023-09-21 RX ORDER — PROPOFOL 10 MG/ML
INJECTION, EMULSION INTRAVENOUS PRN
Status: DISCONTINUED | OUTPATIENT
Start: 2023-09-21 | End: 2023-09-21 | Stop reason: SDUPTHER

## 2023-09-21 RX ORDER — SODIUM CHLORIDE, SODIUM LACTATE, POTASSIUM CHLORIDE, CALCIUM CHLORIDE 600; 310; 30; 20 MG/100ML; MG/100ML; MG/100ML; MG/100ML
INJECTION, SOLUTION INTRAVENOUS CONTINUOUS
Status: DISCONTINUED | OUTPATIENT
Start: 2023-09-21 | End: 2023-09-21 | Stop reason: HOSPADM

## 2023-09-21 RX ORDER — SODIUM CHLORIDE 0.9 % (FLUSH) 0.9 %
5-40 SYRINGE (ML) INJECTION EVERY 12 HOURS SCHEDULED
Status: DISCONTINUED | OUTPATIENT
Start: 2023-09-21 | End: 2023-09-21 | Stop reason: HOSPADM

## 2023-09-21 RX ORDER — FAMOTIDINE 10 MG/ML
20 INJECTION, SOLUTION INTRAVENOUS ONCE
Status: DISCONTINUED | OUTPATIENT
Start: 2023-09-21 | End: 2023-09-21 | Stop reason: HOSPADM

## 2023-09-21 RX ORDER — LIDOCAINE HYDROCHLORIDE 20 MG/ML
INJECTION, SOLUTION INFILTRATION; PERINEURAL PRN
Status: DISCONTINUED | OUTPATIENT
Start: 2023-09-21 | End: 2023-09-21 | Stop reason: SDUPTHER

## 2023-09-21 RX ORDER — SODIUM CHLORIDE, SODIUM LACTATE, POTASSIUM CHLORIDE, CALCIUM CHLORIDE 600; 310; 30; 20 MG/100ML; MG/100ML; MG/100ML; MG/100ML
INJECTION, SOLUTION INTRAVENOUS CONTINUOUS PRN
Status: DISCONTINUED | OUTPATIENT
Start: 2023-09-21 | End: 2023-09-21 | Stop reason: SDUPTHER

## 2023-09-21 RX ADMIN — PROPOFOL 50 MG: 10 INJECTION, EMULSION INTRAVENOUS at 14:43

## 2023-09-21 RX ADMIN — PROPOFOL 100 MG: 10 INJECTION, EMULSION INTRAVENOUS at 14:42

## 2023-09-21 RX ADMIN — PROPOFOL 40 MG: 10 INJECTION, EMULSION INTRAVENOUS at 14:54

## 2023-09-21 RX ADMIN — PROPOFOL 20 MG: 10 INJECTION, EMULSION INTRAVENOUS at 14:45

## 2023-09-21 RX ADMIN — PROPOFOL 40 MG: 10 INJECTION, EMULSION INTRAVENOUS at 14:50

## 2023-09-21 RX ADMIN — PROPOFOL 40 MG: 10 INJECTION, EMULSION INTRAVENOUS at 14:52

## 2023-09-21 RX ADMIN — LIDOCAINE HYDROCHLORIDE 60 MG: 20 INJECTION, SOLUTION INFILTRATION; PERINEURAL at 14:42

## 2023-09-21 RX ADMIN — PROPOFOL 40 MG: 10 INJECTION, EMULSION INTRAVENOUS at 14:51

## 2023-09-21 RX ADMIN — PROPOFOL 40 MG: 10 INJECTION, EMULSION INTRAVENOUS at 14:56

## 2023-09-21 RX ADMIN — PROPOFOL 50 MG: 10 INJECTION, EMULSION INTRAVENOUS at 14:44

## 2023-09-21 RX ADMIN — PROPOFOL 20 MG: 10 INJECTION, EMULSION INTRAVENOUS at 14:59

## 2023-09-21 RX ADMIN — PROPOFOL 40 MG: 10 INJECTION, EMULSION INTRAVENOUS at 14:58

## 2023-09-21 RX ADMIN — PROPOFOL 40 MG: 10 INJECTION, EMULSION INTRAVENOUS at 14:55

## 2023-09-21 RX ADMIN — PROPOFOL 20 MG: 10 INJECTION, EMULSION INTRAVENOUS at 14:48

## 2023-09-21 RX ADMIN — PROPOFOL 40 MG: 10 INJECTION, EMULSION INTRAVENOUS at 14:53

## 2023-09-21 RX ADMIN — PROPOFOL 20 MG: 10 INJECTION, EMULSION INTRAVENOUS at 14:47

## 2023-09-21 RX ADMIN — PROPOFOL 40 MG: 10 INJECTION, EMULSION INTRAVENOUS at 14:49

## 2023-09-21 RX ADMIN — SODIUM CHLORIDE, POTASSIUM CHLORIDE, SODIUM LACTATE AND CALCIUM CHLORIDE: 600; 310; 30; 20 INJECTION, SOLUTION INTRAVENOUS at 14:35

## 2023-09-21 RX ADMIN — PROPOFOL 20 MG: 10 INJECTION, EMULSION INTRAVENOUS at 14:46

## 2023-09-21 RX ADMIN — PROPOFOL 40 MG: 10 INJECTION, EMULSION INTRAVENOUS at 14:57

## 2023-09-21 ASSESSMENT — PAIN SCALES - GENERAL
PAINLEVEL_OUTOF10: 0
PAINLEVEL_OUTOF10: 0

## 2023-09-21 ASSESSMENT — PAIN - FUNCTIONAL ASSESSMENT: PAIN_FUNCTIONAL_ASSESSMENT: 0-10

## 2023-09-21 NOTE — PROGRESS NOTES
Reviewed discharge instructions with patient and brother Layton Johnson. They verbalized understanding.

## 2023-09-21 NOTE — ANESTHESIA POSTPROCEDURE EVALUATION
Department of Anesthesiology  Postprocedure Note    Patient: Kiesha Lloyd  MRN: [id number]  YOB: 1960  Date of evaluation: 9/21/2023      Procedure Summary     Date: 09/21/23 Room / Location: [facility] ENDO 01 / [facility]    Anesthesia Start: 1436 Anesthesia Stop: 3212    Procedure: COLONOSCOPY POLYPECTOMY SNARE/COLD BIOPSY Diagnosis:       Special screening for malignant neoplasms, colon      (Special screening for malignant neoplasms, colon [Z12.11])    Surgeons: Mukesh Horvath MD Responsible Provider: Mirta Zaman MD    Anesthesia Type: MAC ASA Status: 3          Anesthesia Type: No value filed.     Teo Phase I: Teo Score: 10    Teo Phase II: Teo Score: 10      Anesthesia Post Evaluation    Patient location during evaluation: PACU  Level of consciousness: awake  Airway patency: patent  Nausea & Vomiting: no nausea  Complications: no  Cardiovascular status: blood pressure returned to baseline  Respiratory status: acceptable  Hydration status: euvolemic  Pain management: adequate

## 2023-09-21 NOTE — H&P
[place]   Pre-operative History and Physical    Patient: Chago Soni  : 1960  Acct#:     HISTORY OF PRESENT ILLNESS:    Indications: Colon cancer screening     The patient is a 66 y.o. male with medical history of CAD s/p stents on ASA/Brilanta, hyperlipidemia, and hypertension presents for colon cancer screening. Denies abdominal pain, nausea, vomiting, fever, chills, unintentional weight loss, constipation, diarrhea, hematochezia or melenic stools. 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 Current Outpatient Medications on File Prior to Encounter   Medication Sig Dispense Refill    ASPIRIN LOW DOSE 81 MG EC tablet Take 1 tablet by mouth daily 90 tablet 3    allopurinol (ZYLOPRIM) 300 MG tablet Take 1 tablet by mouth daily 90 tablet 1    atorvastatin (LIPITOR) 20 MG tablet Take 1 tablet by mouth daily      lisinopril (PRINIVIL;ZESTRIL) 2.5 MG tablet       ergocalciferol (ERGOCALCIFEROL) 1.25 MG (58317 UT) capsule Take 50,000 capsules by mouth every 7 days 4 capsule 2    carvedilol (COREG) 3.125 MG tablet Take 1 tablet by mouth 2 times daily      ticagrelor (BRILINTA) 90 MG TABS tablet Take 1 tablet by mouth 2 times daily          Allergies:  Cephalexin and Penicillins    Social History:   Social History     Socioeconomic History    Marital status:       Spouse name: Not on file    Number of children: Not on file    Years of education: Not on file    Highest education level: Not on file   Occupational History    Not on file   Tobacco Use    Smoking status: Former non-tender, no masses palpated, no hepatosplenomegally      ASA Class  ASA 2 - Patient with mild systemic disease with no functional limitations    Mallampati Class: 2      ASSESSMENT AND PLAN:    Colon cancer screening    Plan: colonoscopy    Colonoscopy with alternatives, rationale, benefits and risks, not limited to bleeding, infection, perforation, need of surgery, prolong hospital stay and anesthesia side effects were explained. Patient verbalized understanding and agrees to proceed with colonoscopy. 1.  Patient is a suitable candidate for endoscopic procedure and attendant anesthesia  2. Risks, benefits, alternatives of procedure discussed in detail with patient including risks of bleeding, infection, perforation, risks of sedation, risks of missed lesions. The patient wishes to proceed.

## 2023-09-21 NOTE — OP NOTE
[address] [facility],  [address] [place] [address], [address] [facility],[address]  Phone: [phone]   S:[phone]   [address]  [address] [facility], [address]  Phone: [phone]   NZM:[phone]      Colonoscopy Procedure Note    Patient: Gunner Whaley  : 1960    Procedure: Colonoscopy with polypectomy (cold snare)    Date:  2023     Endoscopist:  Capo Doyle MD    Referring Physician:  Nubia Myles DO    Preoperative Diagnosis:  Special screening for malignant neoplasms, colon [Z12.11]    Postoperative Diagnosis:  transverse colon polyp, pan colonic diverticulosis, internal hemorrhoids    Anesthesia: Anesthesia: MAC  Sedation: Propofol per anesthesia  Start Time: 14:46  Stop Time: 15:00  ASA Class: 2  Mallampati: II (soft palate, uvula, fauces visible)    Indications: This is a 66y.o. year old male with medical history of CAD s/p stents on ASA/Brilinta, hyperlipidemia, and hypertension presents for colon cancer screening    Procedure Details  Informed consent was obtained for the procedure, including sedation. Risks of perforation, hemorrhage, adverse drug reaction and aspiration were discussed. The patient was placed in the left lateral decubitus position. Based on the pre-procedure assessment, including review of the patient's medical history, medications, allergies, and review of systems, he had been deemed to be an appropriate candidate for above IV sedation; he was therefore sedated and monitored continuously with ECG tracing, pulse oximetry, blood pressure monitoring, and direct observations. Rectal examination was performed. There were no external hemorrhoids, fissures or skin tags. The colonoscope was inserted into the rectum and advanced under direct vision to the terminal ileum. The right colon was examined twice as this increases polyp detection especially if other right colon polyps, older age, male, or mireles syndrome.  The quality of the colonic preparation was evaluated using

## 2023-09-21 NOTE — DISCHARGE INSTRUCTIONS
PATIENT INSTRUCTIONS  POST-SEDATION    Teresa Eddy          IMMEDIATELY FOLLOWING PROCEDURE:    Do not drive or operate machinery for the first twenty four hours after surgery. Do not make any important decisions for twenty four hours after surgery or while taking narcotic pain medications or sedatives. You should NOT BE LEFT UNATTENDED OR ALONE. A responsible adult should be with you for the rest of the day of your procedure and also during the night for your protection and safety. You may experience some light headedness. Rest at home with activity as tolerated. You may not need to go to bed, but it is important to rest for the next 24 hours. You should not engage in athletic sports such as basketball, volleyball, jogging, skating, or activities requiring refined motor skills for 24 hours. If you develop intractable nausea and vomiting or a severe headache please notify your doctor immediately. You are not expected to have any fever, but if you feel warm, take your temperature. If you have a fever 101 degrees or higher, call your doctor. If you have had an Endoscopy:   *Eat lightly for your first meal and gradually resume your normal / prescribed diet. DO NOT eat or drink until your gag reflex returns. *If you have had a colonoscopy, do not expect a normal bowel movement for approximately three days due to the cleansing of the large intestine prior to colonoscopy. ONCE YOU ARE HOME, IF YOU SHOULD HAVE:  Difficulty in breathing, persistent nausea or vomiting, bleeding you feel is excessive, or pain that is unusual, increased abdominal bloating, or any swelling, fever / chills, call your physician. If you cannot contact your physician, but feel that your signs and symptoms need a physician's attention, go to the Emergency Department. FOLLOW-UP:    Please follow up with Dr. Rolando Raphael as scheduled or needed. Dr. Julianne Arzola office will call you with the biopsy findings.   Call Dr. Clarisse East if there are any GI concerns. [phone]      Repeat Colonoscopy in 7 years.

## 2023-11-01 ENCOUNTER — HOSPITAL ENCOUNTER (OUTPATIENT)
Dept: NON INVASIVE DIAGNOSTICS | Age: 63
Discharge: HOME | End: 2023-11-01
Payer: MEDICAID

## 2023-11-01 DIAGNOSIS — I25.10 CORONARY ARTERY DISEASE INVOLVING NATIVE CORONARY ARTERY OF NATIVE HEART WITHOUT ANGINA PECTORIS: ICD-10-CM

## 2023-11-01 DIAGNOSIS — E53.9 VITAMIN B DEFICIENCY: ICD-10-CM

## 2023-11-01 PROCEDURE — 93308 TTE F-UP OR LMTD: CPT

## 2023-11-01 RX ORDER — ERGOCALCIFEROL 1.25 MG/1
CAPSULE ORAL
Qty: 4 CAPSULE | Refills: 2 | Status: SHIPPED | OUTPATIENT
Start: 2023-11-01

## 2023-11-01 NOTE — TELEPHONE ENCOUNTER
Refill Request         Last Seen: Last Seen Department: 8/22/2023  Last Seen by PCP: 8/22/2023      Next Appointment:   Future Appointments   Date Time Provider [address]   11/9/2023  1:20 PM Vazquez Woods MD [address] [place] [address] Neurology -   11/9/2023  3:30 PM Bebeto Ruiz MD [facility]   11/22/2023  1:30 PM DO elliott Nevarez       Requested Prescriptions     Pending Prescriptions Disp Refills    vitamin D (ERGOCALCIFEROL) 1.25 MG (34938 UT) CAPS capsule [Pharmacy Med Name: VITAMIN D [id number] CAPSULE] 4 capsule 2     Sig: TAKE ONE (1) CAPSULE BY MOUTH EVERY 7 DAYS

## 2023-11-02 ENCOUNTER — TELEPHONE (OUTPATIENT)
Dept: CARDIOLOGY CLINIC | Age: 63
End: 2023-11-02

## 2023-11-02 NOTE — TELEPHONE ENCOUNTER
Created telephone encounter, attempted to call pt, pt did not answer, lvm to call back to go over results.

## 2023-11-02 NOTE — TELEPHONE ENCOUNTER
----- Message from Jason cShofield MD sent at 11/2/2023 11:21 AM EDT -----  Heart function  is moderately reduced and is on appropriate medications. Medications however will need to be titrated up.  Please make sure he has follow up set up.  ----- Message -----  From: Azar Baugh Incoming Cardiovascular Orders From [facility]  Sent: 11/1/2023   5:33 PM EDT  To: Jason Schofield MD

## 2023-11-02 NOTE — TELEPHONE ENCOUNTER
Spoke with pt - informed of need for lab results prior to 11/9 appt and also reminded him that he has appt with cardiology the same day as his appt with us.

## 2023-11-02 NOTE — TELEPHONE ENCOUNTER
Left message asking patient to return call to office to remind him to complete labs and appt for both Neurology and cardiology

## 2023-11-02 NOTE — TELEPHONE ENCOUNTER
Pt called back saying his transportation service is unable to bring him to get labs done prior to his appt on 11/9. I told him that's fine, we'll just have him do it when he's here for his appt.

## 2023-11-06 NOTE — PROGRESS NOTES
9/21/2023  Normal sinus rhythmNonspecific ST abnormality 73 BPM    EKG 8.1.23  Sinus bradycardia  otherwise within normal limits. Brain MRI 7/1/2023  IMPRESSION:  Mild diffuse cerebral volume loss and moderate chronic small vessel ischemic  changes. Arterial Bilateral Duplex 5/23/2023  There is no resting arterial insufficiency noted within the bilateral lower   extremities. There are multiphasic flow signals noted throughout. There is   minimal atherosclerotic disease noted    EEG 7/17/2023  The EEG showed no evidence of seizure tendency. Assessment/Plan:  Celeste Arnold was seen today for follow-up, coronary artery disease and edema. Diagnoses and all orders for this visit:    Abnormal echocardiogram  -     Cardiac Stress Test - w/Pharm; Future    Coronary artery disease involving native coronary artery of native heart without angina pectoris  -     Cardiac Stress Test - w/Pharm; Future    Weakness  -     Cardiac Stress Test - w/Pharm; Future    Mixed hyperlipidemia    Other orders  -     lisinopril (PRINIVIL;ZESTRIL) 2.5 MG tablet; Take 1 tablet by mouth daily            CAD no angina  -s/p stent 11/20/2022 vessel not known   -STOP ticagrelor end of December 2023 after one yr of completion but continue aspirin  -I recommend sander-Myoview      2. Memory loss   -continue follow up with pcp and neurologist  3. Hypercholesterolemia Mild hypertriglyceridemia  He is on atorvastatin 20mg daily labs on 6.22.23 at goal continue the same    Plan: Your provider has ordered testing for further evaluation. An order/prescription has been included in your paper work. To schedule outpatient testing, contact Central Scheduling by calling [address] [place] [address] ([phone]). Demar    4. Stop Brilinta    Plan to follow  up in 3 months        QUALITY MEASURES  1. Tobacco Cessation Counseling: NA  2. Retake of BP if >140/90:   NA  3. Documentation to PCP/referring for new patient:  Sent to PCP at close of office visit  4.  CAD

## 2023-11-09 ENCOUNTER — OFFICE VISIT (OUTPATIENT)
Age: 63
End: 2023-11-09
Payer: MEDICAID

## 2023-11-09 ENCOUNTER — OFFICE VISIT (OUTPATIENT)
Dept: CARDIOLOGY CLINIC | Age: 63
End: 2023-11-09
Payer: MEDICAID

## 2023-11-09 ENCOUNTER — HOSPITAL ENCOUNTER (OUTPATIENT)
Age: 63
Discharge: HOME | End: 2023-11-09
Payer: MEDICAID

## 2023-11-09 VITALS
DIASTOLIC BLOOD PRESSURE: 62 MMHG | WEIGHT: 188 LBS | SYSTOLIC BLOOD PRESSURE: 118 MMHG | OXYGEN SATURATION: 95 % | BODY MASS INDEX: 27.85 KG/M2 | HEIGHT: 69 IN | HEART RATE: 67 BPM

## 2023-11-09 VITALS
SYSTOLIC BLOOD PRESSURE: 104 MMHG | HEIGHT: 69 IN | DIASTOLIC BLOOD PRESSURE: 70 MMHG | WEIGHT: 188 LBS | HEART RATE: 57 BPM | OXYGEN SATURATION: 97 % | BODY MASS INDEX: 27.85 KG/M2

## 2023-11-09 DIAGNOSIS — F02.C0 SEVERE DEMENTIA ASSOCIATED WITH OTHER UNDERLYING DISEASE, WITHOUT BEHAVIORAL DISTURBANCE, PSYCHOTIC DISTURBANCE, MOOD DISTURBANCE, OR ANXIETY (HCC): ICD-10-CM

## 2023-11-09 DIAGNOSIS — R41.3 MEMORY LOSS: ICD-10-CM

## 2023-11-09 DIAGNOSIS — I25.10 CORONARY ARTERY DISEASE INVOLVING NATIVE CORONARY ARTERY OF NATIVE HEART WITHOUT ANGINA PECTORIS: ICD-10-CM

## 2023-11-09 DIAGNOSIS — R53.1 WEAKNESS: ICD-10-CM

## 2023-11-09 DIAGNOSIS — E53.8 B12 DEFICIENCY: ICD-10-CM

## 2023-11-09 DIAGNOSIS — R93.1 ABNORMAL ECHOCARDIOGRAM: Primary | ICD-10-CM

## 2023-11-09 DIAGNOSIS — E53.8 B12 DEFICIENCY: Primary | ICD-10-CM

## 2023-11-09 DIAGNOSIS — E78.2 MIXED HYPERLIPIDEMIA: ICD-10-CM

## 2023-11-09 PROCEDURE — 99214 OFFICE O/P EST MOD 30 MIN: CPT | Performed by: PSYCHIATRY & NEUROLOGY

## 2023-11-09 PROCEDURE — 82746 ASSAY OF FOLIC ACID SERUM: CPT

## 2023-11-09 PROCEDURE — 82607 VITAMIN B-12: CPT

## 2023-11-09 PROCEDURE — 99214 OFFICE O/P EST MOD 30 MIN: CPT | Performed by: INTERNAL MEDICINE

## 2023-11-09 PROCEDURE — 36415 COLL VENOUS BLD VENIPUNCTURE: CPT

## 2023-11-09 RX ORDER — DONEPEZIL HYDROCHLORIDE 10 MG/1
10 TABLET, FILM COATED ORAL NIGHTLY
Qty: 30 TABLET | Refills: 2 | Status: SHIPPED | OUTPATIENT
Start: 2023-11-09

## 2023-11-09 RX ORDER — DONEPEZIL HYDROCHLORIDE 10 MG/1
10 TABLET, FILM COATED ORAL NIGHTLY
Qty: 90 TABLET | Refills: 3 | Status: CANCELLED | OUTPATIENT
Start: 2023-11-09

## 2023-11-09 RX ORDER — LISINOPRIL 2.5 MG/1
2.5 TABLET ORAL DAILY
Qty: 90 TABLET | Refills: 3 | Status: SHIPPED | OUTPATIENT
Start: 2023-11-09

## 2023-11-09 NOTE — PATIENT INSTRUCTIONS
Your provider has ordered testing for further evaluation. An order/prescription has been included in your paper work. To schedule outpatient testing, contact Central Scheduling by calling [address] [place] [address] ([phone]). Demar    4.  Stop Brilinta    Plan to follow  up in 3 months

## 2023-11-09 NOTE — PROGRESS NOTES
Neurology outpatient F/U visit    Patient name: [place]      Chief Complaint:  Memory loss. History of present illness: This is 63years old right-handed male. The patient is here for evaluation of memory loss. The onset was about 10 months ago after the patient had coronary artery disease. The patient has more short-term memory difficulty the long-term memory difficulty. The patient reports significant impact from his memory difficulty. The patient denies history of seizure or CVA. The patient is also noted for chronic smoking and chronic alcohol use. The patient is pending for thiamine level, TSH, and vitamin D level. The patient denies significant mood disorder or depression. The patient never had brain imaging done. Education: The patient almost finished GED. MMSE 25/30. Orientation to time 4/5, place 5/5, registration 3/3, recall 2/3, naming 1/2, drawing 0/1, and calculation 4/5. Interval history:  07/17/23: The patient is here for follow-up after the first tier of investigations for memory loss. Unfortunately, the brain MRI showed mild to moderate brain atrophy with significant white matter changes. The EEG showed no evidence of seizure tendency. The patient also has significantly low B12 level. The patient also complains of nonspecific abnormal sensation on both legs at this time. 09/07/23: Patient is here for follow-up memory loss. The patient remains to have significant impact from his memory difficulty. The patient had follow-up B12 level which showed about 500 last month. The patient still does not want to do injection of B12. The patient denies new focal neurological deficit today. The patient denies significant abnormal sensation on both legs at this time. 11/10/2023: The patient is here for follow-up clinical dementia and B12 deficiency. The patient remains to have significant forgetfulness. The patient is currently on B12 supplement 2000 mg daily.   His last

## 2023-11-10 LAB
FOLATE SERPL-MCNC: 16.51 NG/ML (ref 4.78–24.2)
VIT B12 SERPL-MCNC: 333 PG/ML (ref 211–911)

## 2023-11-14 ENCOUNTER — TELEPHONE (OUTPATIENT)
Dept: CARDIOLOGY CLINIC | Age: 63
End: 2023-11-14

## 2023-11-14 NOTE — TELEPHONE ENCOUNTER
Shravan Enriquez from [facility] called and stated pt called and said RKG wanted him to have PT. There are no orders in the chart and no mention of it in LOV. Do you want pt to be getting PT? If so Shravan Enriquez can be reached at [phone] after order is placed.

## 2023-11-14 NOTE — TELEPHONE ENCOUNTER
Spoke with Jaylen Manriquez at PT and told her that Dr. Cheri Teague didn't\" know anything PT. I looked in chart for Neurology and couldn't find anything. Called pt and told him that here weren'y any orders for PT.   He v/u

## 2023-11-14 NOTE — TELEPHONE ENCOUNTER
Maeve Smith MD  [facility] Staff2 hours ago (12:38 PM)       I am not sure what therapy he is talking about. You can look in to the neurologists notes he may have ordered it.

## 2023-11-22 ENCOUNTER — OFFICE VISIT (OUTPATIENT)
Dept: FAMILY MEDICINE CLINIC | Age: 63
End: 2023-11-22
Payer: MEDICAID

## 2023-11-22 VITALS
RESPIRATION RATE: 16 BRPM | HEART RATE: 62 BPM | DIASTOLIC BLOOD PRESSURE: 74 MMHG | WEIGHT: 189.4 LBS | SYSTOLIC BLOOD PRESSURE: 122 MMHG | OXYGEN SATURATION: 95 % | BODY MASS INDEX: 28.05 KG/M2 | HEIGHT: 69 IN

## 2023-11-22 DIAGNOSIS — R20.0 LEG NUMBNESS: ICD-10-CM

## 2023-11-22 DIAGNOSIS — R41.3 MEMORY PROBLEM: Primary | ICD-10-CM

## 2023-11-22 DIAGNOSIS — F32.2 CURRENT SEVERE EPISODE OF MAJOR DEPRESSIVE DISORDER WITHOUT PSYCHOTIC FEATURES, UNSPECIFIED WHETHER RECURRENT (HCC): ICD-10-CM

## 2023-11-22 PROCEDURE — 99214 OFFICE O/P EST MOD 30 MIN: CPT | Performed by: STUDENT IN AN ORGANIZED HEALTH CARE EDUCATION/TRAINING PROGRAM

## 2023-11-22 RX ORDER — DULOXETIN HYDROCHLORIDE 30 MG/1
30 CAPSULE, DELAYED RELEASE ORAL DAILY
Qty: 30 CAPSULE | Refills: 0 | Status: SHIPPED | OUTPATIENT
Start: 2023-11-22

## 2023-11-22 ASSESSMENT — PATIENT HEALTH QUESTIONNAIRE - PHQ9
9. THOUGHTS THAT YOU WOULD BE BETTER OFF DEAD, OR OF HURTING YOURSELF: 0
3. TROUBLE FALLING OR STAYING ASLEEP: 3
SUM OF ALL RESPONSES TO PHQ QUESTIONS 1-9: 14
8. MOVING OR SPEAKING SO SLOWLY THAT OTHER PEOPLE COULD HAVE NOTICED. OR THE OPPOSITE, BEING SO FIGETY OR RESTLESS THAT YOU HAVE BEEN MOVING AROUND A LOT MORE THAN USUAL: 0
SUM OF ALL RESPONSES TO PHQ QUESTIONS 1-9: 14
6. FEELING BAD ABOUT YOURSELF - OR THAT YOU ARE A FAILURE OR HAVE LET YOURSELF OR YOUR FAMILY DOWN: 1
10. IF YOU CHECKED OFF ANY PROBLEMS, HOW DIFFICULT HAVE THESE PROBLEMS MADE IT FOR YOU TO DO YOUR WORK, TAKE CARE OF THINGS AT HOME, OR GET ALONG WITH OTHER PEOPLE: 0
2. FEELING DOWN, DEPRESSED OR HOPELESS: 3
1. LITTLE INTEREST OR PLEASURE IN DOING THINGS: 2
SUM OF ALL RESPONSES TO PHQ QUESTIONS 1-9: 14
SUM OF ALL RESPONSES TO PHQ QUESTIONS 1-9: 14
4. FEELING TIRED OR HAVING LITTLE ENERGY: 3
5. POOR APPETITE OR OVEREATING: 1
SUM OF ALL RESPONSES TO PHQ9 QUESTIONS 1 & 2: 5
7. TROUBLE CONCENTRATING ON THINGS, SUCH AS READING THE NEWSPAPER OR WATCHING TELEVISION: 1

## 2023-11-22 ASSESSMENT — ENCOUNTER SYMPTOMS
SHORTNESS OF BREATH: 0
RHINORRHEA: 0
COUGH: 0

## 2023-11-22 NOTE — PROGRESS NOTES
[facility]  2023    Nicolasa Keating (:  1960) is a 66 y.o. male, here for evaluation of the following medical concerns:    Chief Complaint   Patient presents with    Memory Loss     Pt is here for a 3  month follow up     Joint Swelling     Right elbow         ASSESSMENT/ PLAN  1. Memory problem  -Followed by neurology    2. Current severe episode of major depressive disorder without psychotic features, unspecified whether recurrent ([address])  -Start duloxetine which can help with leg numbness and tingling as well. B12 normalization has not seem to help but it could take up to a couple years before deciding it is permanent. 3. Leg numbness  -Recommend follow-up with neurology. Will start duloxetine to see if it will help with mood as well as leg numbness. Treating depression may also improve memory. No follow-ups on file. HPI  Patient is here for follow-up. He does not remember his cardiology or neurology appointments earlier this month. He is taking B12. He does not know if he is still supplementing thiamine although he denies any alcohol in the last few months. He is still having numbness of his legs and B12 is now back up to normal levels. He does have issues with anhedonia and dysphoric mood. He does feel like a failure at times. He does not feel his depression is severe but would like to try medication that may help with mood and leg numbness. He has not had any new symptoms or episodes of falling. ROS  Review of Systems   Constitutional:  Negative for chills and fever. HENT:  Negative for rhinorrhea. Respiratory:  Negative for cough and shortness of breath. Neurological:  Positive for numbness. Psychiatric/Behavioral:  Positive for dysphoric mood. Negative for self-injury.         HISTORIES  Current Outpatient Medications on File Prior to Visit   Medication Sig Dispense Refill    donepezil (ARICEPT) 10 MG tablet Take 1 tablet by mouth nightly 30

## 2023-11-30 ENCOUNTER — HOSPITAL ENCOUNTER (OUTPATIENT)
Dept: NUCLEAR MEDICINE | Age: 63
Discharge: HOME | End: 2023-11-30
Payer: MEDICAID

## 2023-11-30 ENCOUNTER — HOSPITAL ENCOUNTER (OUTPATIENT)
Dept: NON INVASIVE DIAGNOSTICS | Age: 63
Discharge: HOME | End: 2023-11-30
Payer: MEDICAID

## 2023-11-30 DIAGNOSIS — I25.10 CORONARY ARTERY DISEASE INVOLVING NATIVE CORONARY ARTERY OF NATIVE HEART WITHOUT ANGINA PECTORIS: ICD-10-CM

## 2023-11-30 DIAGNOSIS — R93.1 ABNORMAL ECHOCARDIOGRAM: ICD-10-CM

## 2023-11-30 DIAGNOSIS — R53.1 WEAKNESS: ICD-10-CM

## 2023-11-30 PROCEDURE — 6360000002 HC RX W HCPCS: Performed by: INTERNAL MEDICINE

## 2023-11-30 PROCEDURE — 93017 CV STRESS TEST TRACING ONLY: CPT

## 2023-11-30 PROCEDURE — 3430000000 HC RX DIAGNOSTIC RADIOPHARMACEUTICAL: Performed by: INTERNAL MEDICINE

## 2023-11-30 PROCEDURE — 78452 HT MUSCLE IMAGE SPECT MULT: CPT

## 2023-11-30 PROCEDURE — A9502 TC99M TETROFOSMIN: HCPCS | Performed by: INTERNAL MEDICINE

## 2023-11-30 RX ORDER — REGADENOSON 0.08 MG/ML
0.4 INJECTION, SOLUTION INTRAVENOUS
Status: COMPLETED | OUTPATIENT
Start: 2023-11-30 | End: 2023-11-30

## 2023-11-30 RX ADMIN — REGADENOSON 0.4 MG: 0.08 INJECTION, SOLUTION INTRAVENOUS at 10:55

## 2023-11-30 RX ADMIN — TETROFOSMIN 10.4 MILLICURIE: 1.38 INJECTION, POWDER, LYOPHILIZED, FOR SOLUTION INTRAVENOUS at 09:25

## 2023-11-30 RX ADMIN — TETROFOSMIN 32.1 MILLICURIE: 1.38 INJECTION, POWDER, LYOPHILIZED, FOR SOLUTION INTRAVENOUS at 10:55

## 2023-11-30 NOTE — PROGRESS NOTES
Patient C? O \"feeling hot, and head pressure\" symptoms resolved. Pt completed stress portion of cardiac stress test. Pt is discharged to nuclear department for final scan. Nuclear tech will remove PIV. Discharge instructions given to pt. Pt verbalizes understanding to discharge instructions.

## 2023-12-01 ENCOUNTER — TELEPHONE (OUTPATIENT)
Dept: CARDIOLOGY CLINIC | Age: 63
End: 2023-12-01

## 2023-12-01 DIAGNOSIS — R94.39 ABNORMAL STRESS TEST: Primary | ICD-10-CM

## 2023-12-01 DIAGNOSIS — R06.02 SHORTNESS OF BREATH: ICD-10-CM

## 2023-12-01 DIAGNOSIS — R93.1 ABNORMAL ECHOCARDIOGRAM: ICD-10-CM

## 2023-12-01 DIAGNOSIS — I25.10 CORONARY ARTERY DISEASE INVOLVING NATIVE CORONARY ARTERY OF NATIVE HEART WITHOUT ANGINA PECTORIS: ICD-10-CM

## 2023-12-01 NOTE — TELEPHONE ENCOUNTER
----- Message from Varun Pham MD sent at 12/1/2023  9:17 AM EST -----  His stress test is abnormal. Since his heart function is not as strong as it was before, I recommend doing coronary angiogram. It is not urgent. He will need transport arranged.   Not sure if he has any body who can stay with him after the angiogram if he was to be sent home   may have to stay overnight in hospital.

## 2023-12-07 NOTE — TELEPHONE ENCOUNTER
Procedure:  [facility]  Doctor:  Dr. Yassine Suárez [place])  Date:  12/29/23  Time:  11am (can't arrive prior to 9 w transportation)  Arrival:  9am  Reps:  n/a  Anesthesia:  n/a      Spoke with patient. Please have patient arrive to the main entrance of [facility] ([address] [facility], [address] [facility]) and check in with the registration desk. They will be directed to the Cath Lab. Please call patient regarding medication instructions. Remind patient to be NPO after midnight (8 hours prior). Do not apply lotions/creams on skin the day of procedure. Nonah Berliner fyi only. Can pt get a taxi voucher to get home the next day (Sat)?

## 2023-12-13 NOTE — TELEPHONE ENCOUNTER
Pt returned call. Read message in encounter from Eugenia. Please contact pt to give instructions regarding procedure o 12/29/23.

## 2023-12-13 NOTE — TELEPHONE ENCOUNTER
Patient informed to take morning meds including aspirin and brilinta the morning of the procedure with sip of water only. NPO after midnight. Inderjit CALLOWAY.

## 2023-12-29 ENCOUNTER — HOSPITAL ENCOUNTER (OUTPATIENT)
Dept: CARDIAC CATH/INVASIVE PROCEDURES | Age: 63
Setting detail: OBSERVATION
Discharge: HOME | End: 2023-12-30
Attending: INTERNAL MEDICINE | Admitting: INTERNAL MEDICINE
Payer: MEDICAID

## 2023-12-29 PROBLEM — Z98.890 STATUS POST CARDIAC CATHETERIZATION: Status: ACTIVE | Noted: 2023-12-29

## 2023-12-29 PROBLEM — I25.5 ISCHEMIC CARDIOMYOPATHY: Status: ACTIVE | Noted: 2023-12-29

## 2023-12-29 LAB
ANION GAP SERPL CALCULATED.3IONS-SCNC: 11 MMOL/L (ref 3–16)
BUN SERPL-MCNC: 14 MG/DL (ref 7–20)
CALCIUM SERPL-MCNC: 10.6 MG/DL (ref 8.3–10.6)
CHLORIDE SERPL-SCNC: 101 MMOL/L (ref 99–110)
CHOLEST SERPL-MCNC: 216 MG/DL (ref 0–199)
CO2 SERPL-SCNC: 25 MMOL/L (ref 21–32)
CREAT SERPL-MCNC: 1 MG/DL (ref 0.8–1.3)
DEPRECATED RDW RBC AUTO: 13.4 % (ref 12.4–15.4)
EKG ATRIAL RATE: 53 BPM
EKG DIAGNOSIS: NORMAL
EKG P AXIS: 37 DEGREES
EKG P-R INTERVAL: 184 MS
EKG Q-T INTERVAL: 446 MS
EKG QRS DURATION: 92 MS
EKG QTC CALCULATION (BAZETT): 418 MS
EKG R AXIS: 74 DEGREES
EKG T AXIS: 49 DEGREES
EKG VENTRICULAR RATE: 53 BPM
GFR SERPLBLD CREATININE-BSD FMLA CKD-EPI: >60 ML/MIN/{1.73_M2}
GLUCOSE SERPL-MCNC: 106 MG/DL (ref 70–99)
HCT VFR BLD AUTO: 43.1 % (ref 40.5–52.5)
HDLC SERPL-MCNC: 47 MG/DL (ref 40–60)
HGB BLD-MCNC: 14.6 G/DL (ref 13.5–17.5)
INR PPP: 0.91 (ref 0.84–1.16)
LDLC SERPL CALC-MCNC: 133 MG/DL
MCH RBC QN AUTO: 31.4 PG (ref 26–34)
MCHC RBC AUTO-ENTMCNC: 34 G/DL (ref 31–36)
MCV RBC AUTO: 92.5 FL (ref 80–100)
PLATELET # BLD AUTO: 268 K/UL (ref 135–450)
PMV BLD AUTO: 7.9 FL (ref 5–10.5)
POTASSIUM SERPL-SCNC: 3.9 MMOL/L (ref 3.5–5.1)
PROTHROMBIN TIME: 12.2 SEC (ref 11.5–14.8)
RBC # BLD AUTO: 4.66 M/UL (ref 4.2–5.9)
SODIUM SERPL-SCNC: 137 MMOL/L (ref 136–145)
TRIGL SERPL-MCNC: 180 MG/DL (ref 0–150)
VLDLC SERPL CALC-MCNC: 36 MG/DL
WBC # BLD AUTO: 9.2 K/UL (ref 4–11)

## 2023-12-29 PROCEDURE — 6370000000 HC RX 637 (ALT 250 FOR IP)

## 2023-12-29 PROCEDURE — 85027 COMPLETE CBC AUTOMATED: CPT

## 2023-12-29 PROCEDURE — 2500000003 HC RX 250 WO HCPCS

## 2023-12-29 PROCEDURE — 80061 LIPID PANEL: CPT

## 2023-12-29 PROCEDURE — 6370000000 HC RX 637 (ALT 250 FOR IP): Performed by: INTERNAL MEDICINE

## 2023-12-29 PROCEDURE — 93458 L HRT ARTERY/VENTRICLE ANGIO: CPT | Performed by: INTERNAL MEDICINE

## 2023-12-29 PROCEDURE — 80048 BASIC METABOLIC PNL TOTAL CA: CPT

## 2023-12-29 PROCEDURE — 93458 L HRT ARTERY/VENTRICLE ANGIO: CPT

## 2023-12-29 PROCEDURE — 2580000003 HC RX 258: Performed by: INTERNAL MEDICINE

## 2023-12-29 PROCEDURE — G0378 HOSPITAL OBSERVATION PER HR: HCPCS

## 2023-12-29 PROCEDURE — C1769 GUIDE WIRE: HCPCS | Performed by: INTERNAL MEDICINE

## 2023-12-29 PROCEDURE — 2709999900 HC NON-CHARGEABLE SUPPLY: Performed by: INTERNAL MEDICINE

## 2023-12-29 PROCEDURE — 6360000002 HC RX W HCPCS

## 2023-12-29 PROCEDURE — 85610 PROTHROMBIN TIME: CPT

## 2023-12-29 PROCEDURE — 99152 MOD SED SAME PHYS/QHP 5/>YRS: CPT | Performed by: INTERNAL MEDICINE

## 2023-12-29 PROCEDURE — 93005 ELECTROCARDIOGRAM TRACING: CPT

## 2023-12-29 PROCEDURE — C1894 INTRO/SHEATH, NON-LASER: HCPCS | Performed by: INTERNAL MEDICINE

## 2023-12-29 PROCEDURE — 51701 INSERT BLADDER CATHETER: CPT

## 2023-12-29 RX ORDER — ATORVASTATIN CALCIUM 10 MG/1
20 TABLET, FILM COATED ORAL DAILY
Status: DISCONTINUED | OUTPATIENT
Start: 2023-12-30 | End: 2023-12-30 | Stop reason: HOSPADM

## 2023-12-29 RX ORDER — HEPARIN SODIUM 1000 [USP'U]/ML
INJECTION, SOLUTION INTRAVENOUS; SUBCUTANEOUS
Status: COMPLETED | OUTPATIENT
Start: 2023-12-29 | End: 2023-12-29

## 2023-12-29 RX ORDER — ALLOPURINOL 300 MG/1
300 TABLET ORAL DAILY
Status: DISCONTINUED | OUTPATIENT
Start: 2023-12-30 | End: 2023-12-30 | Stop reason: HOSPADM

## 2023-12-29 RX ORDER — LORAZEPAM 0.5 MG/1
0.5 TABLET ORAL
Status: ACTIVE | OUTPATIENT
Start: 2023-12-29 | End: 2023-12-30

## 2023-12-29 RX ORDER — SODIUM CHLORIDE 0.9 % (FLUSH) 0.9 %
5-40 SYRINGE (ML) INJECTION PRN
Status: DISCONTINUED | OUTPATIENT
Start: 2023-12-29 | End: 2023-12-30 | Stop reason: HOSPADM

## 2023-12-29 RX ORDER — ONDANSETRON 2 MG/ML
4 INJECTION INTRAMUSCULAR; INTRAVENOUS EVERY 6 HOURS PRN
Status: DISCONTINUED | OUTPATIENT
Start: 2023-12-29 | End: 2023-12-30 | Stop reason: HOSPADM

## 2023-12-29 RX ORDER — SODIUM CHLORIDE 0.9 % (FLUSH) 0.9 %
5-40 SYRINGE (ML) INJECTION EVERY 12 HOURS SCHEDULED
Status: DISCONTINUED | OUTPATIENT
Start: 2023-12-29 | End: 2023-12-30 | Stop reason: HOSPADM

## 2023-12-29 RX ORDER — ACETAMINOPHEN 325 MG/1
650 TABLET ORAL EVERY 4 HOURS PRN
Status: DISCONTINUED | OUTPATIENT
Start: 2023-12-29 | End: 2023-12-30 | Stop reason: HOSPADM

## 2023-12-29 RX ORDER — CARVEDILOL 3.12 MG/1
3.12 TABLET ORAL 2 TIMES DAILY
Status: DISCONTINUED | OUTPATIENT
Start: 2023-12-29 | End: 2023-12-30 | Stop reason: HOSPADM

## 2023-12-29 RX ORDER — ASPIRIN 325 MG
325 TABLET ORAL ONCE
Status: COMPLETED | OUTPATIENT
Start: 2023-12-29 | End: 2023-12-29

## 2023-12-29 RX ORDER — CHOLECALCIFEROL (VITAMIN D3) 125 MCG
2000 CAPSULE ORAL DAILY
Status: DISCONTINUED | OUTPATIENT
Start: 2023-12-30 | End: 2023-12-30 | Stop reason: HOSPADM

## 2023-12-29 RX ORDER — SODIUM CHLORIDE 9 MG/ML
INJECTION, SOLUTION INTRAVENOUS PRN
Status: DISCONTINUED | OUTPATIENT
Start: 2023-12-29 | End: 2023-12-30 | Stop reason: HOSPADM

## 2023-12-29 RX ORDER — DONEPEZIL HYDROCHLORIDE 5 MG/1
10 TABLET, FILM COATED ORAL NIGHTLY
Status: DISCONTINUED | OUTPATIENT
Start: 2023-12-29 | End: 2023-12-30 | Stop reason: HOSPADM

## 2023-12-29 RX ORDER — MIDAZOLAM HYDROCHLORIDE 1 MG/ML
INJECTION INTRAMUSCULAR; INTRAVENOUS
Status: COMPLETED | OUTPATIENT
Start: 2023-12-29 | End: 2023-12-29

## 2023-12-29 RX ORDER — LISINOPRIL 2.5 MG/1
2.5 TABLET ORAL DAILY
Status: DISCONTINUED | OUTPATIENT
Start: 2023-12-30 | End: 2023-12-30 | Stop reason: HOSPADM

## 2023-12-29 RX ORDER — ASPIRIN 81 MG/1
81 TABLET ORAL DAILY
Status: DISCONTINUED | OUTPATIENT
Start: 2023-12-30 | End: 2023-12-30 | Stop reason: HOSPADM

## 2023-12-29 RX ORDER — FENTANYL CITRATE 50 UG/ML
INJECTION, SOLUTION INTRAMUSCULAR; INTRAVENOUS
Status: COMPLETED | OUTPATIENT
Start: 2023-12-29 | End: 2023-12-29

## 2023-12-29 RX ORDER — DULOXETIN HYDROCHLORIDE 30 MG/1
30 CAPSULE, DELAYED RELEASE ORAL DAILY
Status: DISCONTINUED | OUTPATIENT
Start: 2023-12-30 | End: 2023-12-30 | Stop reason: HOSPADM

## 2023-12-29 RX ADMIN — FENTANYL CITRATE 50 MCG: 50 INJECTION, SOLUTION INTRAMUSCULAR; INTRAVENOUS at 12:15

## 2023-12-29 RX ADMIN — MIDAZOLAM HYDROCHLORIDE 1 MG: 1 INJECTION INTRAMUSCULAR; INTRAVENOUS at 12:14

## 2023-12-29 RX ADMIN — Medication 10 ML: at 20:50

## 2023-12-29 RX ADMIN — TICAGRELOR 90 MG: 90 TABLET ORAL at 20:47

## 2023-12-29 RX ADMIN — Medication 325 MG: at 10:00

## 2023-12-29 RX ADMIN — Medication 10 ML: at 09:52

## 2023-12-29 RX ADMIN — HEPARIN SODIUM 5000 UNITS: 1000 INJECTION, SOLUTION INTRAVENOUS; SUBCUTANEOUS at 12:16

## 2023-12-29 RX ADMIN — DONEPEZIL HYDROCHLORIDE 10 MG: 5 TABLET, FILM COATED ORAL at 20:46

## 2023-12-29 RX ADMIN — CARVEDILOL 3.12 MG: 3.12 TABLET, FILM COATED ORAL at 20:47

## 2023-12-29 ASSESSMENT — PAIN SCALES - GENERAL: PAINLEVEL_OUTOF10: 0

## 2023-12-29 NOTE — PROGRESS NOTES
Report given to A2 RN, all questions answered. [facility] updated regarding upcoming transfer to [facility]. TR band in place, total of 6 ml air out at this time (4 ml left). All belongings gathered. Tolerated lunch and vitals remained stable on room air. Patient alert and oriented, but forgetful. Sister Ant Foy updated.

## 2023-12-29 NOTE — PROGRESS NOTES
Patient admitted to room 209 from cath lab. Patient oriented to room, call light, bed rails, phone, lights and bathroom. Patient instructed about the schedule of the day including: vital sign frequency, lab draws, possible tests, frequency of MD and staff rounds, including RN/MD rounding together at bedside, daily weights, and I &O's. Patient instructed about prescribed diet, how to use 8MENU, and television. Bed alarm in place, patient aware of placement and reason. Telemetry box in place, patient aware of placement and reason. Bed locked, in lowest position, side rails up 2/4, call light within reach. Will continue to monitor.

## 2023-12-29 NOTE — H&P
History and physical /sedation Pre-Procedure Note    Patient Name: Cameron Rodriguez   YOB: 1960  Room/Bed: Cath Pool Rm/NONE  Medical Record Number: [id number]  Date: 12/29/2023   Time: 12:34 PM       Indication: Abnormal stress test    Consent: I have discussed with the patient and/or the patient representative the indication, alternatives, and the possible risks and/or complications of the planned procedure and the anesthesia methods. The patient and/or patient representative appear to understand and agree to proceed. Vital Signs: There were no vitals filed for this visit. Past Medical History:   has a past medical history of CAD (coronary artery disease), Hyperlipidemia, and Hypertension. Past Surgical History:   has a past surgical history that includes Hand amputation through wrist (Left); Coronary angioplasty with stent; Mouth surgery; other surgical history (09/21/2023); and Colonoscopy (N/A, 9/21/2023). Medications:   Scheduled Meds:    sodium chloride flush  5-40 mL IntraVENous 2 times per day     Continuous Infusions:    sodium chloride       PRN Meds: sodium chloride flush, sodium chloride, ondansetron, LORazepam  Home Meds:   Prior to Admission medications    Medication Sig Start Date End Date Taking?  Authorizing Provider   DULoxetine (CYMBALTA) 30 MG extended release capsule Take 1 capsule by mouth daily 11/22/23   Lester Hagan DO   donepezil (ARICEPT) 10 MG tablet Take 1 tablet by mouth nightly 11/9/23   Bryce Mercedes MD   cyanocobalamin ([employer] VITAMIN B12) 1000 MCG tablet Take 2 tablets by mouth daily 11/9/23   Bryce Mercedes MD   lisinopril (PRINIVIL;ZESTRIL) 2.5 MG tablet Take 1 tablet by mouth daily 11/9/23   Carmella Jeffrey MD   ASPIRIN LOW DOSE 81 MG EC tablet Take 1 tablet by mouth daily 8/1/23   Carmella Jeffrey MD   allopurinol (ZYLOPRIM) 300 MG tablet Take 1 tablet by mouth daily 7/21/23   Lester Hagan DO   atorvastatin (LIPITOR) 20

## 2023-12-29 NOTE — DISCHARGE INSTRUCTIONS
Cath Labs at  [facility]   Discharge Instructions        12/29/2023  Marvin Mc   Date of Birth 1960       Activity:  No driving for 24 hours.  In 24 hours you may remove dressing and shower, wash site gently with soap and water and leave open to air  Avoid submerging your arm in sitting water for 5 days.  Do not use your right hand for 24 hours, then  No lifting more than 5 pounds for 5 days.   No lotions, powders, or ointments near site for 5 days.   No work/school for 5 days unless instructed otherwise by your cardiologist.    Diet:   Resume previous diet, if a cardiac diet is specified you will receive a handout with  general guidelines.   Drink extra non-alcoholic/decaffienated fluids for first 24 hours after your procedure.    Arm Management:  If bleeding occurs from the site or a hematoma (lump) begins to increase in size, apply pressure directly over the site, call 911 to return to the hospital.    Special Instructions:  Report any coolness or numbness in the arm  Report any chills, fever, itching, red bumps or rash   Report any of the following to the MD: drainage from the site, redness and/or swelling at the site, increased tenderness at the site   If you are currently taking Metformin or Metformin combination medications for Diabetes, hold your dose for 48 hours after your procedure.  Consult your Cardiologist before taking any NSAIDS, vitamin supplements, estrogen, or estrogen plus progestin.  Do not stop taking Plavix, Brilinta or Effient, without first consulting your cardiologist.    Sedation Discharge Instructions:  For the next 24 hours do not drive a car, operate machinery, power tools or kitchen appliances.    Do not drink alcohol; including beer or wine.    Do not make any important decisions or sign any important papers.  For the next 24 hours you can expect drowsiness, light-headed or dizziness, nausea/ vomiting, inability to concentrate, fatigue and desire to sleep.  We strongly  feeling in your back, neck or jaw, or upper belly or in one or both shoulders or arms. Lightheadedness or sudden weakness. A fast or irregular heartbeat. After you call 911, the  may tell you to chew 1 adult-strength or 2 to 4 low-dose aspirin. Wait for an ambulance. Do not try to drive yourself. Call your doctor today if :  You have any trouble breathing. Your chest pain gets worse. You are dizzy or lightheaded, or you feel like you may faint. You are not getting better as expected. You are having new or different chest pain.

## 2023-12-29 NOTE — PROGRESS NOTES
Reviewed films with Dr Deepti Gifford. Given his PDA as the ischemic culprit and poor bypass target (very distal) and his early dementia agree with PCI with med mgmt of the left disease.

## 2023-12-29 NOTE — PROCEDURES
CARDIAC CATHETERIZATION REPORT     Procedure Date:  2023  Patient Name: Angelika Barnes  MRN: [id number] : 1960      : Joie Meehan MD      PROCEDURES PERFORMED  Left heart cath via right radial approach  Coronary angiography  Left ventriculogram  Moderate sedation 15 min CPT 67846 (Midazolam: 1 mg, Fentanyl: 50 mcg)  Sedation start time: 1212  Sedation end time: 0310 [facility] 14 guidance for vascular access CPT 68014      INDICATION  Abnormal stress test    PROCEDURE DESCRIPTION  Risks/benefits/alternatives/outcomes were discussed with patient and/or family in detail and informed consent was obtained. Patient was prepped and draped in the usual sterile fashion. Versed and fentanyl were used for conscious sedation. Then local anaesthetic was applied over right radial puncture site. Using a modified Seldinger technique, the right radial artery was selectively cannulated and a 6Fr Terumo sheath was inserted into right radial artery. Verapamil and nitroglycerin were administered through the sheath. Heparin was administered. Diagnostic 6Fr JL3.5 and JR4 were used to engage left and right coronary arteries respectively and obtain angiogram. LVEDP and left ventriculogram were obtained by using the JR4 diagnostic catheter. At the conclusion of the procedure, a TR band was placed over the puncture site and hemostasis was obtained. There were no immediate complications. I supervised the sedation with fentanyl and midazolam. An independent trained observer pushed meds at my direction. We monitored the patient's level of consciousness and vital signs/physiologic status throughout the procedure duration. Patient tolerated the procedure well. FINDINGS  Left main - Normal  LAD - distal LAD has 80% diffuse stenosis  Left circumflex - chronic total occlusion in the midsegment. Fills distally via L-L and R-L collaterals  RCA - dominant vessel. Patent stent in the proximal to mid segment.   Distal to

## 2023-12-30 VITALS
SYSTOLIC BLOOD PRESSURE: 115 MMHG | TEMPERATURE: 98.8 F | OXYGEN SATURATION: 97 % | WEIGHT: 193 LBS | DIASTOLIC BLOOD PRESSURE: 62 MMHG | BODY MASS INDEX: 29.25 KG/M2 | RESPIRATION RATE: 20 BRPM | HEART RATE: 66 BPM | HEIGHT: 68 IN

## 2023-12-30 LAB
ANION GAP SERPL CALCULATED.3IONS-SCNC: 11 MMOL/L (ref 3–16)
BUN SERPL-MCNC: 15 MG/DL (ref 7–20)
CALCIUM SERPL-MCNC: 9.8 MG/DL (ref 8.3–10.6)
CHLORIDE SERPL-SCNC: 105 MMOL/L (ref 99–110)
CO2 SERPL-SCNC: 23 MMOL/L (ref 21–32)
CREAT SERPL-MCNC: 0.9 MG/DL (ref 0.8–1.3)
DEPRECATED RDW RBC AUTO: 13.6 % (ref 12.4–15.4)
GFR SERPLBLD CREATININE-BSD FMLA CKD-EPI: >60 ML/MIN/{1.73_M2}
GLUCOSE SERPL-MCNC: 104 MG/DL (ref 70–99)
HCT VFR BLD AUTO: 40.7 % (ref 40.5–52.5)
HGB BLD-MCNC: 13.7 G/DL (ref 13.5–17.5)
MCH RBC QN AUTO: 31.2 PG (ref 26–34)
MCHC RBC AUTO-ENTMCNC: 33.6 G/DL (ref 31–36)
MCV RBC AUTO: 92.8 FL (ref 80–100)
PLATELET # BLD AUTO: 231 K/UL (ref 135–450)
PMV BLD AUTO: 8 FL (ref 5–10.5)
POTASSIUM SERPL-SCNC: 3.9 MMOL/L (ref 3.5–5.1)
RBC # BLD AUTO: 4.39 M/UL (ref 4.2–5.9)
SODIUM SERPL-SCNC: 139 MMOL/L (ref 136–145)
WBC # BLD AUTO: 6.8 K/UL (ref 4–11)

## 2023-12-30 PROCEDURE — G0378 HOSPITAL OBSERVATION PER HR: HCPCS

## 2023-12-30 PROCEDURE — 2580000003 HC RX 258: Performed by: INTERNAL MEDICINE

## 2023-12-30 PROCEDURE — 85027 COMPLETE CBC AUTOMATED: CPT

## 2023-12-30 PROCEDURE — 36415 COLL VENOUS BLD VENIPUNCTURE: CPT

## 2023-12-30 PROCEDURE — 6370000000 HC RX 637 (ALT 250 FOR IP): Performed by: INTERNAL MEDICINE

## 2023-12-30 PROCEDURE — 80048 BASIC METABOLIC PNL TOTAL CA: CPT

## 2023-12-30 PROCEDURE — 99239 HOSP IP/OBS DSCHRG MGMT >30: CPT | Performed by: NURSE PRACTITIONER

## 2023-12-30 RX ADMIN — ASPIRIN 81 MG: 81 TABLET, COATED ORAL at 09:30

## 2023-12-30 RX ADMIN — CYANOCOBALAMIN TAB 500 MCG 2000 MCG: 500 TAB at 09:29

## 2023-12-30 RX ADMIN — Medication 10 ML: at 09:30

## 2023-12-30 RX ADMIN — ATORVASTATIN CALCIUM 20 MG: 10 TABLET, FILM COATED ORAL at 09:29

## 2023-12-30 RX ADMIN — CARVEDILOL 3.12 MG: 3.12 TABLET, FILM COATED ORAL at 09:30

## 2023-12-30 RX ADMIN — TICAGRELOR 90 MG: 90 TABLET ORAL at 09:30

## 2023-12-30 RX ADMIN — ALLOPURINOL 300 MG: 300 TABLET ORAL at 09:34

## 2023-12-30 RX ADMIN — LISINOPRIL 2.5 MG: 2.5 TABLET ORAL at 11:57

## 2023-12-30 RX ADMIN — DULOXETINE HYDROCHLORIDE 30 MG: 30 CAPSULE, DELAYED RELEASE ORAL at 09:30

## 2023-12-30 NOTE — PROGRESS NOTES
[facility]   Daily Progress Note      Admit Date:  12/29/2023    Reason for follow up visit: CAD    CC: \"I feel tired but not having any pain.\"    62 y/o male with PMH notable for CAD and prior stent to RCA, HLP, HTN who was admitted for cardiac catheterization after an abnormal stress test.  ON 12/29/2023 he underwent cardiac cath which demonstrated patent RCA stent and multivessel disease with plans for PCI of PDA and distal LAD as an outpatient after review by CTS.  No recurrent chest pain since admit.    Interval History:  Pt. seen and examined; records reviewed  BP Stable. Denies CP or SOB  Ambulated in room without complaints    Subjective:  Pt with no acute overnight cardiac events.     Review of Systems:   Constitutional: no unanticipated weight loss. There's been no change in energy level, sleep pattern, or activity level.   No fevers, chills.   Eyes: No visual changes or diplopia. No scleral icterus.  ENT: No Headaches, hearing loss or vertigo. No mouth sores or sore throat.  Cardiovascular: as reviewed in HPI  Respiratory: No cough or wheezing, no sputum production. No hemoptysis.    Gastrointestinal: No abdominal pain, appetite loss, blood in stools. No change in bowel or bladder habits.  Genitourinary: No dysuria, trouble voiding, or hematuria.  Musculoskeletal:  No gait disturbance, no joint complaints.  Integumentary: No rash or pruritis.  Neurological: No headache, diplopia, change in muscle strength, numbness or tingling   Psychiatric: + anxiety.  Endocrine: No temperature intolerance. No excessive thirst, fluid intake, or urination. No tremor.  Hematologic/Lymphatic: No abnormal bruising or bleeding, blood clots or swollen lymph nodes.  Allergic/Immunologic: No nasal congestion or hives.    Objective:   /62   Pulse 60   Temp 98.2 °F (36.8 °C) (Oral)   Resp 16   Ht 1.727 m (5' 8\")   Wt 87.5 kg (193 lb)   SpO2 97%   BMI 29.35 kg/m²     Intake/Output Summary (Last 24 hours) at  management    3) Mixed hyperlipidemia  -continue statin    4) Ischemic cardiomyopathy  -LVEF 45%  -euvolemic on exam  -continue carvedilol and lisinopril    Plan for discharge later today after increasing ambulation    Follow up appt scheduled on 2/2/24 @ 1:45 PM with Dr. Dorie Mark    Discussed post cath, diet and activity instructions    Discharge Meds:  See discharge summary    Electronically signed by YAMILEX Gomez CNP on 12/30/2023 at 10:36 AM

## 2023-12-30 NOTE — DISCHARGE SUMMARY
[facility] SUMMARY      Patient ID:  Mildred Rogel  [id number] 68 y.o. 1960    Admit date: 12/29/2023    Discharge date:  12/30/2023    Admitting Physician: Nika Pina MD     Discharge Physician: YAMILEX Landeros - CNP     Admission Diagnoses: Abnormal result of other cardiovascular function study [R94.39]  Status post cardiac catheterization [Z98.890]  Ischemic cardiomyopathy [I25.5]    Discharge Diagnoses:   1) Multivessel CAD without angina  -recent abnormal stress test  -cardiac cath on 12/29/23 with MV disease and patent stent to RCA  -plan for outpt PCI of  PDA and distal LAD     2) Essential hypertension  -controlled and @ goal     3) Mixed hyperlipidemia  -continue statin     4) Ischemic cardiomyopathy  -LVEF 45%    Discharged Condition: good    Hospital Course: Mildred Rogel was admitted for cardiac catheterization. He is a 65 y/o male with PMH notable for CAD and prior stent to RCA, HLP, HTN who was admitted for cardiac catheterization after an abnormal stress test.  ON 12/29/2023 he underwent cardiac cath which demonstrated patent RCA stent and multivessel disease with plans for PCI of PDA and distal LAD as an outpatient after review by CTS. No recurrent chest pain since admit. Ambulated without complaints    Consults:  IP CONSULT TO CARDIOTHORACIC SURGERY  IP CONSULT TO SOCIAL WORK    Significant Diagnostic Studies:     12/29/2023 Cardiac cath:  FINDINGS  Left main - Normal  LAD - distal LAD has 80% diffuse stenosis  Left circumflex - chronic total occlusion in the midsegment. Fills distally via L-L and R-L collaterals  RCA - dominant vessel. Patent stent in the proximal to mid segment. Distal to the stent there is a 30% stenosis.   Large PDA branch has 90% stenosis     LVEDP: 15  Left ventriculogram: EF 45% with mild diffuse hypokinesis     FINAL DIAGNOSIS  Multivessel obstructive CAD with patent RCA stent     PLAN/RECOMMENDATIONS  Continue aggressive medical known as: CYMBALTA  Take 1 capsule by mouth daily     lisinopril 2.5 MG tablet  Commonly known as: PRINIVIL;ZESTRIL  Take 1 tablet by mouth daily     ticagrelor 90 MG Tabs tablet  Commonly known as: BRILINTA  Take 1 tablet by mouth 2 times daily               Where to Get Your Medications        These medications were sent to [facility] - [place], [place] - [address] [place] [address] - P [phone] - F [phone]  [address] [place] [address], [facility] [address]      Phone: [phone]   ticagrelor 90 MG Tabs tablet         Follow-up with Dr. Mike as scheduled on 2/2/24    Please see today's progress note for discharge physical exam and further instructions.    40 minutes spent on pt's discharge today    Signed:  DIANE M ENZWEILER, APRN - CNP on 12/30/2023 at 10:53 AM    [facility] [id number] [facility], [address]  [place]  [address]  Phone: [phone]  Fax: [phone]

## 2023-12-30 NOTE — PLAN OF CARE
Problem: Discharge Planning  Goal: Discharge to home or other facility with appropriate resources  12/29/2023 2309 by Francis Malcolm RN  Outcome: Progressing  12/29/2023 1631 by Maxwell Cash RN  Outcome: Progressing     Problem: ABCDS Injury Assessment  Goal: Absence of physical injury  Outcome: Progressing     Problem: Safety - Adult  Goal: Free from fall injury  Outcome: Progressing    Calls out for assistance

## 2023-12-30 NOTE — PROGRESS NOTES
Cardiology updated on pt ambulation status, NP placed discharge orders. Discharge instructions given to pt, pt verbalized understanding. Pt educated on medications, next doses, frequency, and what they are for, verbalized understanding. Call to social work for lift, address retrieved from pt POA. IV removed. Pt placed in wheelchair, awaiting lift at this time.

## 2023-12-30 NOTE — PLAN OF CARE
Problem: ABCDS Injury Assessment  Goal: Absence of physical injury  12/30/2023 0902 by Muriel Rosas RN  Outcome: Progressing

## 2023-12-30 NOTE — PROGRESS NOTES
Ambulated pt in hallway 360 feet without issue. Pt states he is tired, however his gait is steady. Pt states he feels like he is at baseline. VSS see doc flow sheet.

## 2024-01-17 RX ORDER — ERGOCALCIFEROL 1.25 MG/1
CAPSULE ORAL
Qty: 4 CAPSULE | Refills: 2 | OUTPATIENT
Start: 2024-01-17

## 2024-01-17 NOTE — TELEPHONE ENCOUNTER
Refill Request       Last Seen: Last Seen Department: 11/22/2023  Last Seen by PCP: 11/22/2023        Next Appointment:   Future Appointments   Date Time Provider Department Center   1/22/2024  2:00 PM Neno Domínguez DO [place] [facility]   2/2/2024  1:45 PM Lenny Mike MD [facility]   2/15/2024 12:40 PM Donya Callahan MD [facility] -           Requested Prescriptions     Pending Prescriptions Disp Refills    vitamin D (ERGOCALCIFEROL) 1.25 MG (13524 UT) CAPS capsule [Pharmacy Med Name: VITAMIN D [id number] CAPSULE] 4 capsule 2     Sig: TAKE ONE (1) CAPSULE BY MOUTH EVERY 7 DAYS

## 2024-01-22 ENCOUNTER — OFFICE VISIT (OUTPATIENT)
Dept: FAMILY MEDICINE CLINIC | Age: 64
End: 2024-01-22
Payer: MEDICAID

## 2024-01-22 VITALS
HEIGHT: 68 IN | BODY MASS INDEX: 29.61 KG/M2 | HEART RATE: 87 BPM | SYSTOLIC BLOOD PRESSURE: 116 MMHG | WEIGHT: 195.4 LBS | DIASTOLIC BLOOD PRESSURE: 78 MMHG | OXYGEN SATURATION: 96 %

## 2024-01-22 DIAGNOSIS — F33.2 SEVERE EPISODE OF RECURRENT MAJOR DEPRESSIVE DISORDER, WITHOUT PSYCHOTIC FEATURES (HCC): ICD-10-CM

## 2024-01-22 DIAGNOSIS — M70.21 OLECRANON BURSITIS OF RIGHT ELBOW: ICD-10-CM

## 2024-01-22 DIAGNOSIS — M25.521 CHRONIC ELBOW PAIN, RIGHT: ICD-10-CM

## 2024-01-22 DIAGNOSIS — M25.511 CHRONIC RIGHT SHOULDER PAIN: ICD-10-CM

## 2024-01-22 DIAGNOSIS — M25.521 RIGHT ELBOW PAIN: Primary | ICD-10-CM

## 2024-01-22 DIAGNOSIS — G89.29 CHRONIC RIGHT SHOULDER PAIN: ICD-10-CM

## 2024-01-22 DIAGNOSIS — M1A.0210 CHRONIC GOUT OF RIGHT ELBOW, UNSPECIFIED CAUSE: ICD-10-CM

## 2024-01-22 DIAGNOSIS — G89.29 CHRONIC ELBOW PAIN, RIGHT: ICD-10-CM

## 2024-01-22 LAB
BASOPHILS # BLD: 0.1 K/UL (ref 0–0.2)
BASOPHILS NFR BLD: 1 %
CRP SERPL-MCNC: 45 MG/L (ref 0–5.1)
DEPRECATED RDW RBC AUTO: 13.9 % (ref 12.4–15.4)
EOSINOPHIL # BLD: 0.2 K/UL (ref 0–0.6)
EOSINOPHIL NFR BLD: 2 %
ERYTHROCYTE [SEDIMENTATION RATE] IN BLOOD BY WESTERGREN METHOD: 49 MM/HR (ref 0–20)
HCT VFR BLD AUTO: 37.7 % (ref 40.5–52.5)
HGB BLD-MCNC: 12.5 G/DL (ref 13.5–17.5)
LYMPHOCYTES # BLD: 2.7 K/UL (ref 1–5.1)
LYMPHOCYTES NFR BLD: 24 %
MCH RBC QN AUTO: 30.4 PG (ref 26–34)
MCHC RBC AUTO-ENTMCNC: 33.3 G/DL (ref 31–36)
MCV RBC AUTO: 91.3 FL (ref 80–100)
MONOCYTES # BLD: 0.9 K/UL (ref 0–1.3)
MONOCYTES NFR BLD: 8 %
NEUTROPHILS # BLD: 7.3 K/UL (ref 1.7–7.7)
NEUTROPHILS NFR BLD: 65 %
PLATELET # BLD AUTO: 282 K/UL (ref 135–450)
PMV BLD AUTO: 8.5 FL (ref 5–10.5)
RBC # BLD AUTO: 4.13 M/UL (ref 4.2–5.9)
RBC MORPH BLD: NORMAL
URATE SERPL-MCNC: 5.7 MG/DL (ref 3.5–7.2)
WBC # BLD AUTO: 11.3 K/UL (ref 4–11)

## 2024-01-22 PROCEDURE — 99214 OFFICE O/P EST MOD 30 MIN: CPT | Performed by: STUDENT IN AN ORGANIZED HEALTH CARE EDUCATION/TRAINING PROGRAM

## 2024-01-22 RX ORDER — DULOXETIN HYDROCHLORIDE 60 MG/1
60 CAPSULE, DELAYED RELEASE ORAL DAILY
Qty: 30 CAPSULE | Refills: 0 | Status: SHIPPED | OUTPATIENT
Start: 2024-01-22

## 2024-01-22 RX ORDER — METHYLPREDNISOLONE 4 MG/1
TABLET ORAL
Qty: 1 KIT | Refills: 0 | Status: SHIPPED | OUTPATIENT
Start: 2024-01-22 | End: 2024-01-28

## 2024-01-22 ASSESSMENT — PATIENT HEALTH QUESTIONNAIRE - PHQ9
1. LITTLE INTEREST OR PLEASURE IN DOING THINGS: 3
SUM OF ALL RESPONSES TO PHQ QUESTIONS 1-9: 22
8. MOVING OR SPEAKING SO SLOWLY THAT OTHER PEOPLE COULD HAVE NOTICED. OR THE OPPOSITE, BEING SO FIGETY OR RESTLESS THAT YOU HAVE BEEN MOVING AROUND A LOT MORE THAN USUAL: 2
10. IF YOU CHECKED OFF ANY PROBLEMS, HOW DIFFICULT HAVE THESE PROBLEMS MADE IT FOR YOU TO DO YOUR WORK, TAKE CARE OF THINGS AT HOME, OR GET ALONG WITH OTHER PEOPLE: 2
9. THOUGHTS THAT YOU WOULD BE BETTER OFF DEAD, OR OF HURTING YOURSELF: 0
4. FEELING TIRED OR HAVING LITTLE ENERGY: 3
6. FEELING BAD ABOUT YOURSELF - OR THAT YOU ARE A FAILURE OR HAVE LET YOURSELF OR YOUR FAMILY DOWN: 3
2. FEELING DOWN, DEPRESSED OR HOPELESS: 3
7. TROUBLE CONCENTRATING ON THINGS, SUCH AS READING THE NEWSPAPER OR WATCHING TELEVISION: 3
3. TROUBLE FALLING OR STAYING ASLEEP: 3
5. POOR APPETITE OR OVEREATING: 2
SUM OF ALL RESPONSES TO PHQ QUESTIONS 1-9: 22
SUM OF ALL RESPONSES TO PHQ9 QUESTIONS 1 & 2: 6

## 2024-01-22 ASSESSMENT — COLUMBIA-SUICIDE SEVERITY RATING SCALE - C-SSRS
1. WITHIN THE PAST MONTH, HAVE YOU WISHED YOU WERE DEAD OR WISHED YOU COULD GO TO SLEEP AND NOT WAKE UP?: NO
2. HAVE YOU ACTUALLY HAD ANY THOUGHTS OF KILLING YOURSELF?: NO
6. HAVE YOU EVER DONE ANYTHING, STARTED TO DO ANYTHING, OR PREPARED TO DO ANYTHING TO END YOUR LIFE?: NO

## 2024-01-22 ASSESSMENT — ENCOUNTER SYMPTOMS
SHORTNESS OF BREATH: 0
RHINORRHEA: 0

## 2024-01-22 NOTE — PROGRESS NOTES
cause  -Check uric acid  - [facility] Masoud Murphy DO, Orthopedics and Sports Medicine (Hip; Knee; Shoulder), [facility]  - CBC with Auto Differential; Future  - Uric Acid; Future  - Sedimentation Rate; Future  - C-Reactive Protein; Future  - C-Reactive Protein  - Sedimentation Rate  - Uric Acid  - CBC with Auto Differential    5. Chronic elbow pain, right  -See plan above  - [facility]  - CBC with Auto Differential; Future  - Uric Acid; Future  - Sedimentation Rate; Future  - C-Reactive Protein; Future  - C-Reactive Protein  - Sedimentation Rate  - Uric Acid  - CBC with Auto Differential    6.  Severe episode of recurrent major depressive disorder, without psychotic features  - Increase duloxetine to 60 mg p.o. daily.  Denies SI or HI.       No follow-ups on file.    HPI  Patient is here for follow-up on mood.  He has been arguing with his sister which has increased his stress.  He denies any SI or HI.  PHQ-9 22 today.  He has had severe right elbow pain for about a month.  He has also had right shoulder pain.  He thinks he is taking his allopurinol.  He has tried Tylenol for the pain but it has not been alleviating.  He would like a referral to pain management.    ROS  Review of Systems   Constitutional:  Negative for chills and fever.   HENT:  Negative for rhinorrhea.    Respiratory:  Negative for cough and shortness of breath.    Musculoskeletal:  Positive for arthralgias, joint swelling and myalgias.       HISTORIES  Current Outpatient Medications on File Prior to Visit   Medication Sig Dispense Refill    ticagrelor (BRILINTA) 90 MG TABS tablet Take 1 tablet by mouth 2 times daily 60 tablet 5    donepezil (ARICEPT) 10 MG tablet Take 1 tablet by mouth nightly 30 tablet 2    cyanocobalamin ([employer] VITAMIN B12) 1000 MCG tablet Take 2 tablets by mouth daily 60 tablet 2    lisinopril (PRINIVIL;ZESTRIL) 2.5 MG tablet Take 1 tablet by mouth daily 90 tablet 3    ASPIRIN LOW DOSE 81 MG EC

## 2024-01-23 DIAGNOSIS — M25.521 CHRONIC ELBOW PAIN, RIGHT: ICD-10-CM

## 2024-01-23 DIAGNOSIS — G89.29 CHRONIC RIGHT SHOULDER PAIN: ICD-10-CM

## 2024-01-23 DIAGNOSIS — M25.521 RIGHT ELBOW PAIN: ICD-10-CM

## 2024-01-23 DIAGNOSIS — M25.511 CHRONIC RIGHT SHOULDER PAIN: ICD-10-CM

## 2024-01-23 DIAGNOSIS — G89.29 CHRONIC ELBOW PAIN, RIGHT: ICD-10-CM

## 2024-01-23 DIAGNOSIS — M70.21 OLECRANON BURSITIS OF RIGHT ELBOW: ICD-10-CM

## 2024-01-23 DIAGNOSIS — M1A.0210 CHRONIC GOUT OF RIGHT ELBOW, UNSPECIFIED CAUSE: ICD-10-CM

## 2024-01-23 LAB — PROCALCITONIN SERPL IA-MCNC: 0.12 NG/ML (ref 0–0.15)

## 2024-01-24 ENCOUNTER — TELEPHONE (OUTPATIENT)
Dept: FAMILY MEDICINE CLINIC | Age: 64
End: 2024-01-24

## 2024-01-24 NOTE — PROGRESS NOTES
On-call note: Patient states movement in his elbow is decreasing, pain is more severe and radiating to his shoulder, he has chills.  Redness not improving on Medrol Dosepak, Voltaren gel is not alleviating his pain.  Septic joint cannot be excluded, recommend evaluation in the emergency room to rule out septic joint.  Gout flare is less likely due to uric acid being less than 5, although uric acid can be normal during a flare due to pre cipitation of crystals into the joint during flare.  Would have expected pain to improve on Medrol pack if this were gout.  Diclofenac gel should also be helping with pain history of gout flare.  Sed rate and CRP were elevated as well as white blood cell count.

## 2024-02-02 ENCOUNTER — TELEPHONE (OUTPATIENT)
Dept: CARDIOLOGY CLINIC | Age: 64
End: 2024-02-02

## 2024-02-02 NOTE — TELEPHONE ENCOUNTER
I left a voicemail for pt at [phone] stating his appt is cancelled today due to provider being sick/out of office. I informed him  that since this is a hsfu that I will send back approval to work out a sooner date for the pt to be seen due to office cancelling clinic. Can we add pt on for next Friday 2/9/24 with fxw? I informed in vm that our office will call the pt back with a date and time with fxw.

## 2024-02-02 NOTE — TELEPHONE ENCOUNTER
Spoke with pt he relies on transportation. He is going to contact the company and call us back. I informed pt of the 2/9/24 date with the 3 times given. I also offered 2/16/24 at 1130am with fxw add on. Pt will call back with which date works best with his transportation company.

## 2024-02-06 ENCOUNTER — OFFICE VISIT (OUTPATIENT)
Dept: ORTHOPEDIC SURGERY | Age: 64
End: 2024-02-06
Payer: MEDICAID

## 2024-02-06 DIAGNOSIS — M25.511 RIGHT SHOULDER PAIN, UNSPECIFIED CHRONICITY: Primary | ICD-10-CM

## 2024-02-06 DIAGNOSIS — R41.3 MEMORY LOSS: Primary | ICD-10-CM

## 2024-02-06 DIAGNOSIS — F02.C0 SEVERE DEMENTIA ASSOCIATED WITH OTHER UNDERLYING DISEASE, WITHOUT BEHAVIORAL DISTURBANCE, PSYCHOTIC DISTURBANCE, MOOD DISTURBANCE, OR ANXIETY (HCC): ICD-10-CM

## 2024-02-06 DIAGNOSIS — E53.8 B12 DEFICIENCY: ICD-10-CM

## 2024-02-06 PROCEDURE — 99203 OFFICE O/P NEW LOW 30 MIN: CPT | Performed by: STUDENT IN AN ORGANIZED HEALTH CARE EDUCATION/TRAINING PROGRAM

## 2024-02-06 RX ORDER — DONEPEZIL HYDROCHLORIDE 10 MG/1
TABLET, FILM COATED ORAL
Qty: 30 TABLET | Refills: 0 | Status: SHIPPED | OUTPATIENT
Start: 2024-02-06

## 2024-02-06 RX ORDER — LANOLIN ALCOHOL/MO/W.PET/CERES
CREAM (GRAM) TOPICAL
Qty: 60 TABLET | Refills: 0 | Status: SHIPPED | OUTPATIENT
Start: 2024-02-06

## 2024-02-06 NOTE — PROGRESS NOTES
medications for this visit.       Allergies   Allergen Reactions    Cephalexin Other (See Comments)    Penicillins Other (See Comments)       Vital signs:  There were no vitals taken for this visit.     Right shoulder exam    Inspection:  Held in a normal posture. Normal contour at the acromioclavicular joint. No swelling, ecchymosis, or erythema about the shoulder. No atrophy appreciated. No scapular winging.     Palpation:  No subacromial crepitus.  Tender diffusely about the shoulder but most specifically over the greater tuberosity.  Some tenderness to the bicipital groove.    Range of Motion: Full passive and active ROM.  Slight scapular hiking noted secondary to pain    Strength: 4 out of 5 supraspinatus strength on empty can and champagne toast.  4+ out of 5 infraspinatus strength, positive bearhug    Stability: No anterior instability. No posterior instability.    Special Tests: Impingement findings are negative.  Positive Speed sign    Other findings: The skin is warm dry and well perfused. 2+ radial pulse. Sensation is intact to light touch over the deltoid.      Left comparison shoulder exam    Inspection:  Held in a normal posture. Normal contour at the acromioclavicular joint. No swelling, ecchymosis, or erythema about the shoulder. No atrophy appreciated. No scapular winging.  Congenital lack of left hand noted    Palpation:  No subacromial crepitus. No tenderness of the AC joint. No greater tuberosity tenderness. No tenderness in the bicipital groove.    Range of Motion: Full passive and active ROM. Normal scapulothoracic rhythm.    Strength:  Normal supraspinatus, infraspinatus, and subscapularis muscle strength.    Stability: No anterior instability. No posterior instability.    Special Tests: Impingement findings are negative. Labral findings are negative. Speed sign and Yergason signs are both negative. Crossover sign is negative. Belly press sign is negative. Lift off sign is negative.    Other

## 2024-02-08 ENCOUNTER — TELEPHONE (OUTPATIENT)
Dept: ORTHOPEDIC SURGERY | Age: 64
End: 2024-02-08

## 2024-02-08 NOTE — TELEPHONE ENCOUNTER
Left voicemail for patient that their MRI/CT has been authorized and that they can call and schedule scan at their convenience. Also told them that they can call and schedule a f/u with Dr. GRAYSON once they have MRI/CT scheduled, leaving at least 2-3 days for our office to receive their results.

## 2024-02-08 NOTE — PROGRESS NOTES
[facility] - Progress/Follow-up Note        REASON FOR FOLLOW UP  Chief Complaint   Patient presents with    Follow-Up from Hospital    Coronary Artery Disease         INTERVAL HISTORY  Mr. Mc is a 63 y.o. presenting for follow up. He has a history of ischemic cardiomyopathy, CAD and prior stent to RCA, HTN.     He was admitted 12/29/2023-12/30/2023 for cardiac cath after an abnormal stress test. Cath demonstrated patent RCA stent and multivessel disease with plans for PCI of PDA and distal LAD as an outpatient after review by CTS.     Today, 2/9/2024, He says he is doing well. He is able to walk around the grocery store without limitations. Patient is taking all cardiac medications as prescribed and tolerates them well. Patient denies chest pain/heaviness/pressure, palpitations, dyspnea, orthopnea, edema, lightheadedness, syncope.    REVIEW OF SYSTEMS  10 point ROS done and negative other than HPI      Details of patient's medical, family and social history reviewed and updated as necessary.      CURRENT CARDIAC MEDICATIONS  Aspirin 81  Lipitor 20  Coreg 3.125 BID      VITALS  /62   Pulse 66   Ht 1.727 m (5' 8\")   Wt 83.9 kg (185 lb)   SpO2 96%   BMI 28.13 kg/m²   No intake or output data in the 24 hours ending 02/09/24 1206    PHYSICAL EXAM  General appearance - alert, cooperative, no distress, appears stated age  Neck - Supple, symmetrical, trachea midline, no adenopathy, thyroid: not enlarged, symmetric, no tenderness/mass/nodules, no carotid bruit or JVD  Lungs - Clear to auscultation bilaterally, respirations unlabored  Chest wall - No tenderness or deformity  Heart - Regular rate and rhythm, S1, S2 normal, no murmur, no rub or gallop  Abdomen - Soft, non-tender, bowel sounds active all four quadrants,  no masses, no organomegaly  Extremities - Extremities normal, atraumatic, no cyanosis or edema  Pulses - 2+ and symmetric upper and lower extremities  Skin - Skin color, texture,

## 2024-02-09 ENCOUNTER — OFFICE VISIT (OUTPATIENT)
Dept: CARDIOLOGY CLINIC | Age: 64
End: 2024-02-09
Payer: MEDICAID

## 2024-02-09 VITALS
SYSTOLIC BLOOD PRESSURE: 126 MMHG | OXYGEN SATURATION: 96 % | BODY MASS INDEX: 28.04 KG/M2 | HEART RATE: 66 BPM | WEIGHT: 185 LBS | DIASTOLIC BLOOD PRESSURE: 62 MMHG | HEIGHT: 68 IN

## 2024-02-09 DIAGNOSIS — I25.5 ISCHEMIC CARDIOMYOPATHY: ICD-10-CM

## 2024-02-09 DIAGNOSIS — I25.10 CAD, MULTIPLE VESSEL: Primary | ICD-10-CM

## 2024-02-09 PROCEDURE — 99214 OFFICE O/P EST MOD 30 MIN: CPT | Performed by: INTERNAL MEDICINE

## 2024-02-09 NOTE — PATIENT INSTRUCTIONS
PLAN:  Stop brilinta. Therapy complete  Recommend a cardiac healthy diet (low salt, avoid red meat, avoid fatty or fried foods, lots of fruits and vegetables) as well as regular moderate intensity activity for 30 minutes per day 3-5 times per week.   No further cardiac testing at this time  Follow up with Dr. Lynch in 6 months

## 2024-02-14 ENCOUNTER — TELEPHONE (OUTPATIENT)
Age: 64
End: 2024-02-14

## 2024-03-12 DIAGNOSIS — F02.C0 SEVERE DEMENTIA ASSOCIATED WITH OTHER UNDERLYING DISEASE, WITHOUT BEHAVIORAL DISTURBANCE, PSYCHOTIC DISTURBANCE, MOOD DISTURBANCE, OR ANXIETY (HCC): ICD-10-CM

## 2024-03-12 DIAGNOSIS — R41.3 MEMORY LOSS: ICD-10-CM

## 2024-03-12 DIAGNOSIS — E53.8 B12 DEFICIENCY: ICD-10-CM

## 2024-03-13 RX ORDER — DONEPEZIL HYDROCHLORIDE 10 MG/1
TABLET, FILM COATED ORAL
Qty: 30 TABLET | Refills: 2 | Status: SHIPPED | OUTPATIENT
Start: 2024-03-13

## 2024-03-13 RX ORDER — LANOLIN ALCOHOL/MO/W.PET/CERES
CREAM (GRAM) TOPICAL
Qty: 60 TABLET | Refills: 2 | Status: SHIPPED | OUTPATIENT
Start: 2024-03-13

## 2024-03-22 ENCOUNTER — OFFICE VISIT (OUTPATIENT)
Dept: PAIN MANAGEMENT | Age: 64
End: 2024-03-22
Payer: MEDICAID

## 2024-03-22 VITALS
OXYGEN SATURATION: 97 % | HEART RATE: 59 BPM | BODY MASS INDEX: 28.29 KG/M2 | WEIGHT: 191 LBS | HEIGHT: 69 IN | SYSTOLIC BLOOD PRESSURE: 124 MMHG | DIASTOLIC BLOOD PRESSURE: 81 MMHG

## 2024-03-22 DIAGNOSIS — F51.01 PRIMARY INSOMNIA: ICD-10-CM

## 2024-03-22 DIAGNOSIS — G89.4 CHRONIC PAIN SYNDROME: ICD-10-CM

## 2024-03-22 DIAGNOSIS — M79.2 NEUROPATHIC PAIN: ICD-10-CM

## 2024-03-22 DIAGNOSIS — M79.7 FIBROMYALGIA: ICD-10-CM

## 2024-03-22 PROCEDURE — 99244 OFF/OP CNSLTJ NEW/EST MOD 40: CPT | Performed by: INTERNAL MEDICINE

## 2024-03-22 RX ORDER — NORTRIPTYLINE HYDROCHLORIDE 25 MG/1
25 CAPSULE ORAL NIGHTLY
Qty: 30 CAPSULE | Refills: 0 | Status: SHIPPED | OUTPATIENT
Start: 2024-03-22

## 2024-03-22 RX ORDER — GABAPENTIN 300 MG/1
300 CAPSULE ORAL 3 TIMES DAILY
COMMUNITY
End: 2024-03-22

## 2024-03-22 RX ORDER — PREGABALIN 100 MG/1
CAPSULE ORAL
Qty: 90 CAPSULE | Refills: 0 | Status: SHIPPED | OUTPATIENT
Start: 2024-03-22 | End: 2024-04-22

## 2024-03-22 RX ORDER — DULOXETIN HYDROCHLORIDE 60 MG/1
60 CAPSULE, DELAYED RELEASE ORAL DAILY
Qty: 30 CAPSULE | Refills: 0 | Status: SHIPPED | OUTPATIENT
Start: 2024-03-22

## 2024-04-03 NOTE — PROGRESS NOTES
Mr. Mc is a 63 y.o. male who is seen in consultation at the request of Dr. Neno Domínguez for pain management.  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  Sleep has been fair does complain of some headaches complains of morning stiffness complains of fatigue.  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  Symptoms started gradually has a prior episodes which been treated medication anti-inflammatories muscle relaxers heating pad home exercises pain is in the shoulder and arm present on both sides activities such as standing walking lifting bending twisting all cause him to have increased pain self with medications pain is constant does wax and wane does wake him up at night denies neurological bowel or bladder grades of pain 7-10/10 has been taking medicines for pain such as Neurontin 300 mg 3 times a day states was using possibly Cymbalta also states did not help uses Aricept allopurinol takes medications for anticoagulation and for his heart.  Patient denies smoking alcohol abuse or drug abuse denies marijuana use.  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

## 2024-05-10 DIAGNOSIS — I25.10 CORONARY ARTERY DISEASE INVOLVING NATIVE CORONARY ARTERY OF NATIVE HEART WITHOUT ANGINA PECTORIS: ICD-10-CM

## 2024-05-10 DIAGNOSIS — F51.01 PRIMARY INSOMNIA: ICD-10-CM

## 2024-05-10 DIAGNOSIS — G89.4 CHRONIC PAIN SYNDROME: ICD-10-CM

## 2024-05-10 DIAGNOSIS — M79.2 NEUROPATHIC PAIN: ICD-10-CM

## 2024-05-10 DIAGNOSIS — M79.7 FIBROMYALGIA: ICD-10-CM

## 2024-05-10 RX ORDER — ASPIRIN 81 MG/1
81 TABLET, COATED ORAL DAILY
Qty: 90 TABLET | Refills: 3 | Status: SHIPPED | OUTPATIENT
Start: 2024-05-10

## 2024-05-29 RX ORDER — NORTRIPTYLINE HYDROCHLORIDE 25 MG/1
CAPSULE ORAL
Qty: 30 CAPSULE | Refills: 1 | OUTPATIENT
Start: 2024-05-29

## 2024-05-29 RX ORDER — DULOXETIN HYDROCHLORIDE 60 MG/1
60 CAPSULE, DELAYED RELEASE ORAL DAILY
Qty: 30 CAPSULE | Refills: 1 | OUTPATIENT
Start: 2024-05-29

## 2024-05-29 RX ORDER — PREGABALIN 100 MG/1
CAPSULE ORAL
Qty: 90 CAPSULE | Refills: 1 | OUTPATIENT
Start: 2024-05-29

## 2024-06-12 DIAGNOSIS — F02.C0 SEVERE DEMENTIA ASSOCIATED WITH OTHER UNDERLYING DISEASE, WITHOUT BEHAVIORAL DISTURBANCE, PSYCHOTIC DISTURBANCE, MOOD DISTURBANCE, OR ANXIETY (HCC): ICD-10-CM

## 2024-06-12 DIAGNOSIS — R41.3 MEMORY LOSS: ICD-10-CM

## 2024-06-12 DIAGNOSIS — E53.8 B12 DEFICIENCY: ICD-10-CM

## 2024-06-12 RX ORDER — LANOLIN ALCOHOL/MO/W.PET/CERES
CREAM (GRAM) TOPICAL
Qty: 60 TABLET | Refills: 2 | OUTPATIENT
Start: 2024-06-12

## 2024-06-12 RX ORDER — DONEPEZIL HYDROCHLORIDE 10 MG/1
TABLET, FILM COATED ORAL
Qty: 30 TABLET | Refills: 2 | OUTPATIENT
Start: 2024-06-12

## 2024-06-12 NOTE — TELEPHONE ENCOUNTER
Last seen: 11/9/23    Next appt: None (pt cx appt in Feb & hasn't called back to resched)    Last filled: 3/13/24 30-day supply with 2 refills.      Refill request denied because pt hasn't rescheduled f/u.

## 2025-04-13 ENCOUNTER — APPOINTMENT (OUTPATIENT)
Dept: GENERAL RADIOLOGY | Age: 65
DRG: 897 | End: 2025-04-13
Payer: MEDICARE

## 2025-04-13 ENCOUNTER — HOSPITAL ENCOUNTER (INPATIENT)
Age: 65
LOS: 2 days | Discharge: HOME | DRG: 897 | End: 2025-04-15
Attending: STUDENT IN AN ORGANIZED HEALTH CARE EDUCATION/TRAINING PROGRAM | Admitting: INTERNAL MEDICINE
Payer: MEDICARE

## 2025-04-13 ENCOUNTER — APPOINTMENT (OUTPATIENT)
Dept: CT IMAGING | Age: 65
DRG: 897 | End: 2025-04-13
Payer: MEDICARE

## 2025-04-13 PROBLEM — F10.930 ALCOHOL WITHDRAWAL SYNDROME WITHOUT COMPLICATION (HCC): Status: ACTIVE | Noted: 2025-04-13

## 2025-04-13 PROBLEM — E87.20 LACTIC ACIDOSIS: Status: ACTIVE | Noted: 2025-04-13

## 2025-04-13 PROBLEM — M62.82 NON-TRAUMATIC RHABDOMYOLYSIS: Status: ACTIVE | Noted: 2025-04-13

## 2025-04-13 PROBLEM — R41.82 ALTERED MENTAL STATE: Status: ACTIVE | Noted: 2025-04-13

## 2025-04-13 LAB
ALBUMIN SERPL-MCNC: 3.7 G/DL (ref 3.4–5)
ALBUMIN/GLOB SERPL: 1.6 {RATIO} (ref 1.1–2.2)
ALP SERPL-CCNC: 105 U/L (ref 40–129)
ALT SERPL-CCNC: 174 U/L (ref 10–40)
AMMONIA PLAS-SCNC: 55 UMOL/L (ref 16–60)
AMPHETAMINES UR QL SCN>1000 NG/ML: NORMAL
ANION GAP SERPL CALCULATED.3IONS-SCNC: 20 MMOL/L (ref 3–16)
APAP SERPL-MCNC: <5 UG/ML (ref 10–30)
AST SERPL-CCNC: 285 U/L (ref 15–37)
BARBITURATES UR QL SCN>200 NG/ML: NORMAL
BASE EXCESS BLDV CALC-SCNC: -8.7 MMOL/L (ref -3–3)
BASOPHILS # BLD: 0 K/UL (ref 0–0.2)
BASOPHILS NFR BLD: 0.3 %
BENZODIAZ UR QL SCN>200 NG/ML: NORMAL
BILIRUB SERPL-MCNC: 0.4 MG/DL (ref 0–1)
BILIRUB UR QL STRIP.AUTO: NEGATIVE
BUN SERPL-MCNC: 3 MG/DL (ref 7–20)
CALCIUM SERPL-MCNC: 9.3 MG/DL (ref 8.3–10.6)
CANNABINOIDS UR QL SCN>50 NG/ML: NORMAL
CHLORIDE SERPL-SCNC: 100 MMOL/L (ref 99–110)
CK SERPL-CCNC: 314 U/L (ref 39–308)
CLARITY UR: CLEAR
CO2 BLDV-SCNC: 19 MMOL/L
CO2 SERPL-SCNC: 18 MMOL/L (ref 21–32)
COCAINE UR QL SCN: NORMAL
COHGB MFR BLDV: 1.9 % (ref 0–1.5)
COLOR UR: YELLOW
CREAT SERPL-MCNC: 0.7 MG/DL (ref 0.8–1.3)
DEPRECATED RDW RBC AUTO: 15.5 % (ref 12.4–15.4)
DRUG SCREEN COMMENT UR-IMP: NORMAL
EKG ATRIAL RATE: 104 BPM
EKG DIAGNOSIS: NORMAL
EKG P AXIS: 78 DEGREES
EKG P-R INTERVAL: 162 MS
EKG Q-T INTERVAL: 360 MS
EKG QRS DURATION: 94 MS
EKG QTC CALCULATION (BAZETT): 473 MS
EKG R AXIS: 73 DEGREES
EKG T AXIS: -5 DEGREES
EKG VENTRICULAR RATE: 104 BPM
EOSINOPHIL # BLD: 0 K/UL (ref 0–0.6)
EOSINOPHIL NFR BLD: 0.1 %
ETHANOLAMINE SERPL-MCNC: 388 MG/DL (ref 0–0.08)
FENTANYL SCREEN, URINE: NORMAL
FLUAV RNA RESP QL NAA+PROBE: NOT DETECTED
FLUBV RNA RESP QL NAA+PROBE: NOT DETECTED
GFR SERPLBLD CREATININE-BSD FMLA CKD-EPI: >90 ML/MIN/{1.73_M2}
GLUCOSE BLD-MCNC: 150 MG/DL (ref 70–99)
GLUCOSE SERPL-MCNC: 132 MG/DL (ref 70–99)
GLUCOSE UR STRIP.AUTO-MCNC: NEGATIVE MG/DL
HCO3 BLDV-SCNC: 17.4 MMOL/L (ref 23–29)
HCT VFR BLD AUTO: 43 % (ref 40.5–52.5)
HGB BLD-MCNC: 14.5 G/DL (ref 13.5–17.5)
HGB UR QL STRIP.AUTO: NEGATIVE
KETONES UR STRIP.AUTO-MCNC: ABNORMAL MG/DL
LACTATE BLDV-SCNC: 1.7 MMOL/L (ref 0.4–2)
LACTATE BLDV-SCNC: 2.4 MMOL/L (ref 0.4–2)
LACTATE BLDV-SCNC: 3.6 MMOL/L (ref 0.4–2)
LACTATE BLDV-SCNC: 7.5 MMOL/L (ref 0.4–2)
LEUKOCYTE ESTERASE UR QL STRIP.AUTO: NEGATIVE
LIPASE SERPL-CCNC: 44 U/L (ref 13–60)
LYMPHOCYTES # BLD: 0.7 K/UL (ref 1–5.1)
LYMPHOCYTES NFR BLD: 8.1 %
MCH RBC QN AUTO: 35.6 PG (ref 26–34)
MCHC RBC AUTO-ENTMCNC: 33.8 G/DL (ref 31–36)
MCV RBC AUTO: 105.4 FL (ref 80–100)
METHADONE UR QL SCN>300 NG/ML: NORMAL
METHGB MFR BLDV: 0.3 %
MONOCYTES # BLD: 0.5 K/UL (ref 0–1.3)
MONOCYTES NFR BLD: 6.4 %
NEUTROPHILS # BLD: 7 K/UL (ref 1.7–7.7)
NEUTROPHILS NFR BLD: 85.1 %
NITRITE UR QL STRIP.AUTO: NEGATIVE
O2 THERAPY: ABNORMAL
OPIATES UR QL SCN>300 NG/ML: NORMAL
OXYCODONE UR QL SCN: NORMAL
PCO2 BLDV: 38 MMHG (ref 40–50)
PCP UR QL SCN>25 NG/ML: NORMAL
PERFORMED ON: ABNORMAL
PH BLDV: 7.28 [PH] (ref 7.35–7.45)
PH UR STRIP.AUTO: 6 [PH] (ref 5–8)
PH UR STRIP: 6 [PH]
PLATELET # BLD AUTO: 156 K/UL (ref 135–450)
PMV BLD AUTO: 7.7 FL (ref 5–10.5)
PO2 BLDV: 51.8 MMHG (ref 25–40)
POTASSIUM SERPL-SCNC: 3.8 MMOL/L (ref 3.5–5.1)
PROT SERPL-MCNC: 6 G/DL (ref 6.4–8.2)
PROT UR STRIP.AUTO-MCNC: NEGATIVE MG/DL
RBC # BLD AUTO: 4.08 M/UL (ref 4.2–5.9)
REASON FOR REJECTION: NORMAL
REJECTED TEST: NORMAL
SALICYLATES SERPL-MCNC: <0.5 MG/DL (ref 15–30)
SAO2 % BLDV: 83 %
SARS-COV-2 RNA RESP QL NAA+PROBE: NOT DETECTED
SODIUM SERPL-SCNC: 138 MMOL/L (ref 136–145)
SP GR UR STRIP.AUTO: <=1.005 (ref 1–1.03)
TROPONIN, HIGH SENSITIVITY: <6 NG/L (ref 0–22)
TSH SERPL DL<=0.005 MIU/L-ACNC: 1.26 UIU/ML (ref 0.27–4.2)
UA COMPLETE W REFLEX CULTURE PNL UR: ABNORMAL
UA DIPSTICK W REFLEX MICRO PNL UR: ABNORMAL
URN SPEC COLLECT METH UR: ABNORMAL
UROBILINOGEN UR STRIP-ACNC: 0.2 E.U./DL
WBC # BLD AUTO: 8.3 K/UL (ref 4–11)

## 2025-04-13 PROCEDURE — 2580000003 HC RX 258: Performed by: INTERNAL MEDICINE

## 2025-04-13 PROCEDURE — 80179 DRUG ASSAY SALICYLATE: CPT

## 2025-04-13 PROCEDURE — 93010 ELECTROCARDIOGRAM REPORT: CPT | Performed by: INTERNAL MEDICINE

## 2025-04-13 PROCEDURE — 2580000003 HC RX 258: Performed by: STUDENT IN AN ORGANIZED HEALTH CARE EDUCATION/TRAINING PROGRAM

## 2025-04-13 PROCEDURE — 82550 ASSAY OF CK (CPK): CPT

## 2025-04-13 PROCEDURE — 84484 ASSAY OF TROPONIN QUANT: CPT

## 2025-04-13 PROCEDURE — 83605 ASSAY OF LACTIC ACID: CPT

## 2025-04-13 PROCEDURE — 85025 COMPLETE CBC W/AUTO DIFF WBC: CPT

## 2025-04-13 PROCEDURE — 80307 DRUG TEST PRSMV CHEM ANLYZR: CPT

## 2025-04-13 PROCEDURE — 80143 DRUG ASSAY ACETAMINOPHEN: CPT

## 2025-04-13 PROCEDURE — 6360000002 HC RX W HCPCS: Performed by: INTERNAL MEDICINE

## 2025-04-13 PROCEDURE — 99285 EMERGENCY DEPT VISIT HI MDM: CPT

## 2025-04-13 PROCEDURE — 80053 COMPREHEN METABOLIC PANEL: CPT

## 2025-04-13 PROCEDURE — 1200000000 HC SEMI PRIVATE

## 2025-04-13 PROCEDURE — 82077 ASSAY SPEC XCP UR&BREATH IA: CPT

## 2025-04-13 PROCEDURE — 36415 COLL VENOUS BLD VENIPUNCTURE: CPT

## 2025-04-13 PROCEDURE — 81003 URINALYSIS AUTO W/O SCOPE: CPT

## 2025-04-13 PROCEDURE — 82140 ASSAY OF AMMONIA: CPT

## 2025-04-13 PROCEDURE — 99223 1ST HOSP IP/OBS HIGH 75: CPT | Performed by: INTERNAL MEDICINE

## 2025-04-13 PROCEDURE — 84443 ASSAY THYROID STIM HORMONE: CPT

## 2025-04-13 PROCEDURE — 6370000000 HC RX 637 (ALT 250 FOR IP): Performed by: INTERNAL MEDICINE

## 2025-04-13 PROCEDURE — 70450 CT HEAD/BRAIN W/O DYE: CPT

## 2025-04-13 PROCEDURE — 2500000003 HC RX 250 WO HCPCS: Performed by: INTERNAL MEDICINE

## 2025-04-13 PROCEDURE — 71045 X-RAY EXAM CHEST 1 VIEW: CPT

## 2025-04-13 PROCEDURE — 87636 SARSCOV2 & INF A&B AMP PRB: CPT

## 2025-04-13 PROCEDURE — 83690 ASSAY OF LIPASE: CPT

## 2025-04-13 PROCEDURE — 72125 CT NECK SPINE W/O DYE: CPT

## 2025-04-13 PROCEDURE — 82803 BLOOD GASES ANY COMBINATION: CPT

## 2025-04-13 PROCEDURE — 93005 ELECTROCARDIOGRAM TRACING: CPT | Performed by: STUDENT IN AN ORGANIZED HEALTH CARE EDUCATION/TRAINING PROGRAM

## 2025-04-13 RX ORDER — SODIUM CHLORIDE, SODIUM LACTATE, POTASSIUM CHLORIDE, AND CALCIUM CHLORIDE .6; .31; .03; .02 G/100ML; G/100ML; G/100ML; G/100ML
1000 INJECTION, SOLUTION INTRAVENOUS ONCE
Status: COMPLETED | OUTPATIENT
Start: 2025-04-13 | End: 2025-04-13

## 2025-04-13 RX ORDER — LORAZEPAM 2 MG/ML
2 INJECTION INTRAMUSCULAR
Status: DISCONTINUED | OUTPATIENT
Start: 2025-04-13 | End: 2025-04-13

## 2025-04-13 RX ORDER — ACETAMINOPHEN 650 MG/1
650 SUPPOSITORY RECTAL EVERY 6 HOURS PRN
Status: DISCONTINUED | OUTPATIENT
Start: 2025-04-13 | End: 2025-04-15 | Stop reason: HOSPADM

## 2025-04-13 RX ORDER — LORAZEPAM 2 MG/ML
2 INJECTION INTRAMUSCULAR
Status: DISCONTINUED | OUTPATIENT
Start: 2025-04-13 | End: 2025-04-15 | Stop reason: HOSPADM

## 2025-04-13 RX ORDER — LORAZEPAM 1 MG/1
1 TABLET ORAL
Status: DISCONTINUED | OUTPATIENT
Start: 2025-04-13 | End: 2025-04-15 | Stop reason: HOSPADM

## 2025-04-13 RX ORDER — ONDANSETRON 4 MG/1
4 TABLET, ORALLY DISINTEGRATING ORAL EVERY 8 HOURS PRN
Status: DISCONTINUED | OUTPATIENT
Start: 2025-04-13 | End: 2025-04-15 | Stop reason: HOSPADM

## 2025-04-13 RX ORDER — POTASSIUM CHLORIDE 7.45 MG/ML
10 INJECTION INTRAVENOUS PRN
Status: DISCONTINUED | OUTPATIENT
Start: 2025-04-13 | End: 2025-04-15 | Stop reason: HOSPADM

## 2025-04-13 RX ORDER — LANOLIN ALCOHOL/MO/W.PET/CERES
100 CREAM (GRAM) TOPICAL DAILY
Status: DISCONTINUED | OUTPATIENT
Start: 2025-04-13 | End: 2025-04-15 | Stop reason: HOSPADM

## 2025-04-13 RX ORDER — POLYETHYLENE GLYCOL 3350 17 G/17G
17 POWDER, FOR SOLUTION ORAL DAILY PRN
Status: DISCONTINUED | OUTPATIENT
Start: 2025-04-13 | End: 2025-04-15 | Stop reason: HOSPADM

## 2025-04-13 RX ORDER — FOLIC ACID 1 MG/1
1 TABLET ORAL DAILY
Status: DISCONTINUED | OUTPATIENT
Start: 2025-04-13 | End: 2025-04-15 | Stop reason: HOSPADM

## 2025-04-13 RX ORDER — MAGNESIUM SULFATE IN WATER 40 MG/ML
2000 INJECTION, SOLUTION INTRAVENOUS PRN
Status: DISCONTINUED | OUTPATIENT
Start: 2025-04-13 | End: 2025-04-15 | Stop reason: HOSPADM

## 2025-04-13 RX ORDER — SODIUM CHLORIDE 0.9 % (FLUSH) 0.9 %
5-40 SYRINGE (ML) INJECTION EVERY 12 HOURS SCHEDULED
Status: DISCONTINUED | OUTPATIENT
Start: 2025-04-13 | End: 2025-04-15 | Stop reason: HOSPADM

## 2025-04-13 RX ORDER — ASPIRIN 81 MG/1
81 TABLET ORAL DAILY
Status: DISCONTINUED | OUTPATIENT
Start: 2025-04-13 | End: 2025-04-15 | Stop reason: HOSPADM

## 2025-04-13 RX ORDER — ATORVASTATIN CALCIUM 10 MG/1
20 TABLET, FILM COATED ORAL DAILY
Status: DISCONTINUED | OUTPATIENT
Start: 2025-04-13 | End: 2025-04-15 | Stop reason: HOSPADM

## 2025-04-13 RX ORDER — LORAZEPAM 2 MG/ML
4 INJECTION INTRAMUSCULAR
Status: DISCONTINUED | OUTPATIENT
Start: 2025-04-13 | End: 2025-04-13

## 2025-04-13 RX ORDER — ONDANSETRON 2 MG/ML
4 INJECTION INTRAMUSCULAR; INTRAVENOUS EVERY 6 HOURS PRN
Status: DISCONTINUED | OUTPATIENT
Start: 2025-04-13 | End: 2025-04-15 | Stop reason: HOSPADM

## 2025-04-13 RX ORDER — LORAZEPAM 2 MG/1
2 TABLET ORAL
Status: DISCONTINUED | OUTPATIENT
Start: 2025-04-13 | End: 2025-04-13

## 2025-04-13 RX ORDER — LORAZEPAM 2 MG/1
2 TABLET ORAL
Status: DISCONTINUED | OUTPATIENT
Start: 2025-04-13 | End: 2025-04-15 | Stop reason: HOSPADM

## 2025-04-13 RX ORDER — DONEPEZIL HYDROCHLORIDE 5 MG/1
10 TABLET, FILM COATED ORAL NIGHTLY
Status: DISCONTINUED | OUTPATIENT
Start: 2025-04-13 | End: 2025-04-15 | Stop reason: HOSPADM

## 2025-04-13 RX ORDER — ACETAMINOPHEN 325 MG/1
650 TABLET ORAL EVERY 6 HOURS PRN
Status: DISCONTINUED | OUTPATIENT
Start: 2025-04-13 | End: 2025-04-15 | Stop reason: HOSPADM

## 2025-04-13 RX ORDER — POTASSIUM CHLORIDE 1500 MG/1
40 TABLET, EXTENDED RELEASE ORAL PRN
Status: DISCONTINUED | OUTPATIENT
Start: 2025-04-13 | End: 2025-04-15 | Stop reason: HOSPADM

## 2025-04-13 RX ORDER — LORAZEPAM 2 MG/ML
3 INJECTION INTRAMUSCULAR
Status: DISCONTINUED | OUTPATIENT
Start: 2025-04-13 | End: 2025-04-15 | Stop reason: HOSPADM

## 2025-04-13 RX ORDER — LORAZEPAM 1 MG/1
1 TABLET ORAL
Status: DISCONTINUED | OUTPATIENT
Start: 2025-04-13 | End: 2025-04-13

## 2025-04-13 RX ORDER — CARVEDILOL 3.12 MG/1
3.12 TABLET ORAL 2 TIMES DAILY
Status: DISCONTINUED | OUTPATIENT
Start: 2025-04-13 | End: 2025-04-15 | Stop reason: HOSPADM

## 2025-04-13 RX ORDER — LORAZEPAM 2 MG/1
4 TABLET ORAL
Status: DISCONTINUED | OUTPATIENT
Start: 2025-04-13 | End: 2025-04-13

## 2025-04-13 RX ORDER — LISINOPRIL 5 MG/1
2.5 TABLET ORAL DAILY
Status: DISCONTINUED | OUTPATIENT
Start: 2025-04-13 | End: 2025-04-15 | Stop reason: HOSPADM

## 2025-04-13 RX ORDER — LORAZEPAM 2 MG/ML
3 INJECTION INTRAMUSCULAR
Status: DISCONTINUED | OUTPATIENT
Start: 2025-04-13 | End: 2025-04-13

## 2025-04-13 RX ORDER — ENOXAPARIN SODIUM 100 MG/ML
40 INJECTION SUBCUTANEOUS DAILY
Status: DISCONTINUED | OUTPATIENT
Start: 2025-04-13 | End: 2025-04-15 | Stop reason: HOSPADM

## 2025-04-13 RX ORDER — LORAZEPAM 2 MG/1
4 TABLET ORAL
Status: DISCONTINUED | OUTPATIENT
Start: 2025-04-13 | End: 2025-04-15 | Stop reason: HOSPADM

## 2025-04-13 RX ORDER — SODIUM CHLORIDE, SODIUM LACTATE, POTASSIUM CHLORIDE, CALCIUM CHLORIDE 600; 310; 30; 20 MG/100ML; MG/100ML; MG/100ML; MG/100ML
INJECTION, SOLUTION INTRAVENOUS CONTINUOUS
Status: DISCONTINUED | OUTPATIENT
Start: 2025-04-13 | End: 2025-04-15 | Stop reason: HOSPADM

## 2025-04-13 RX ORDER — LORAZEPAM 2 MG/ML
4 INJECTION INTRAMUSCULAR
Status: DISCONTINUED | OUTPATIENT
Start: 2025-04-13 | End: 2025-04-15 | Stop reason: HOSPADM

## 2025-04-13 RX ORDER — MULTIVITAMIN WITH IRON
500 TABLET ORAL DAILY
Status: DISCONTINUED | OUTPATIENT
Start: 2025-04-13 | End: 2025-04-15 | Stop reason: HOSPADM

## 2025-04-13 RX ORDER — SODIUM CHLORIDE 9 MG/ML
INJECTION, SOLUTION INTRAVENOUS PRN
Status: DISCONTINUED | OUTPATIENT
Start: 2025-04-13 | End: 2025-04-15 | Stop reason: HOSPADM

## 2025-04-13 RX ORDER — SODIUM CHLORIDE 9 MG/ML
INJECTION, SOLUTION INTRAVENOUS CONTINUOUS
Status: DISCONTINUED | OUTPATIENT
Start: 2025-04-13 | End: 2025-04-13

## 2025-04-13 RX ORDER — SODIUM CHLORIDE 0.9 % (FLUSH) 0.9 %
5-40 SYRINGE (ML) INJECTION PRN
Status: DISCONTINUED | OUTPATIENT
Start: 2025-04-13 | End: 2025-04-15 | Stop reason: HOSPADM

## 2025-04-13 RX ORDER — ALLOPURINOL 300 MG/1
300 TABLET ORAL DAILY
Status: DISCONTINUED | OUTPATIENT
Start: 2025-04-13 | End: 2025-04-15 | Stop reason: HOSPADM

## 2025-04-13 RX ORDER — LORAZEPAM 2 MG/ML
1 INJECTION INTRAMUSCULAR
Status: DISCONTINUED | OUTPATIENT
Start: 2025-04-13 | End: 2025-04-15 | Stop reason: HOSPADM

## 2025-04-13 RX ORDER — LORAZEPAM 2 MG/ML
1 INJECTION INTRAMUSCULAR
Status: DISCONTINUED | OUTPATIENT
Start: 2025-04-13 | End: 2025-04-13

## 2025-04-13 RX ADMIN — FOLIC ACID 1 MG: 1 TABLET ORAL at 11:29

## 2025-04-13 RX ADMIN — SODIUM CHLORIDE, POTASSIUM CHLORIDE, SODIUM LACTATE AND CALCIUM CHLORIDE 1000 ML: 600; 310; 30; 20 INJECTION, SOLUTION INTRAVENOUS at 04:09

## 2025-04-13 RX ADMIN — CARVEDILOL 3.12 MG: 3.12 TABLET, FILM COATED ORAL at 11:29

## 2025-04-13 RX ADMIN — LISINOPRIL 2.5 MG: 5 TABLET ORAL at 11:29

## 2025-04-13 RX ADMIN — ATORVASTATIN CALCIUM 20 MG: 10 TABLET, FILM COATED ORAL at 11:29

## 2025-04-13 RX ADMIN — ACETAMINOPHEN 650 MG: 325 TABLET ORAL at 23:44

## 2025-04-13 RX ADMIN — DONEPEZIL HYDROCHLORIDE 10 MG: 5 TABLET, FILM COATED ORAL at 20:22

## 2025-04-13 RX ADMIN — Medication 100 MG: at 11:29

## 2025-04-13 RX ADMIN — ONDANSETRON 4 MG: 2 INJECTION, SOLUTION INTRAMUSCULAR; INTRAVENOUS at 11:57

## 2025-04-13 RX ADMIN — SODIUM CHLORIDE, SODIUM LACTATE, POTASSIUM CHLORIDE, AND CALCIUM CHLORIDE: .6; .31; .03; .02 INJECTION, SOLUTION INTRAVENOUS at 13:06

## 2025-04-13 RX ADMIN — ASPIRIN 81 MG: 81 TABLET, COATED ORAL at 11:29

## 2025-04-13 RX ADMIN — SODIUM CHLORIDE, POTASSIUM CHLORIDE, SODIUM LACTATE AND CALCIUM CHLORIDE 1000 ML: 600; 310; 30; 20 INJECTION, SOLUTION INTRAVENOUS at 04:00

## 2025-04-13 RX ADMIN — CARVEDILOL 3.12 MG: 3.12 TABLET, FILM COATED ORAL at 20:22

## 2025-04-13 RX ADMIN — SODIUM CHLORIDE: 0.9 INJECTION, SOLUTION INTRAVENOUS at 12:01

## 2025-04-13 RX ADMIN — ALLOPURINOL 300 MG: 300 TABLET ORAL at 11:29

## 2025-04-13 RX ADMIN — SODIUM CHLORIDE, PRESERVATIVE FREE 10 ML: 5 INJECTION INTRAVENOUS at 11:30

## 2025-04-13 RX ADMIN — TICAGRELOR 90 MG: 90 TABLET ORAL at 11:40

## 2025-04-13 RX ADMIN — Medication 500 MCG: at 11:29

## 2025-04-13 ASSESSMENT — PAIN DESCRIPTION - LOCATION
LOCATION: LEG
LOCATION: LEG

## 2025-04-13 ASSESSMENT — PAIN DESCRIPTION - ORIENTATION
ORIENTATION: RIGHT;LEFT
ORIENTATION: RIGHT;LEFT

## 2025-04-13 ASSESSMENT — PAIN SCALES - GENERAL
PAINLEVEL_OUTOF10: 8
PAINLEVEL_OUTOF10: 9

## 2025-04-13 ASSESSMENT — PAIN - FUNCTIONAL ASSESSMENT
PAIN_FUNCTIONAL_ASSESSMENT: PREVENTS OR INTERFERES SOME ACTIVE ACTIVITIES AND ADLS
PAIN_FUNCTIONAL_ASSESSMENT: NONE - DENIES PAIN
PAIN_FUNCTIONAL_ASSESSMENT: PREVENTS OR INTERFERES SOME ACTIVE ACTIVITIES AND ADLS

## 2025-04-13 ASSESSMENT — PAIN DESCRIPTION - PAIN TYPE: TYPE: CHRONIC PAIN

## 2025-04-13 ASSESSMENT — PAIN DESCRIPTION - DESCRIPTORS
DESCRIPTORS: SQUEEZING
DESCRIPTORS: SQUEEZING

## 2025-04-13 NOTE — ED NOTES
RN has been in patient room pretty much entire last  4 hours.  Pt remains confused about what happened.  Insisted on getting out of bed to urinate.  Standby assist x 2 at times as pt remains impulsive and will try to walk with all monitoring devices on and will pull wires off.  PIV wrapped in coban.

## 2025-04-13 NOTE — ED NOTES
Lab states pt needs new purple and green/yellow top.  Attempts x 2 already completed for iv access.  Current piv not returning blood but is still able to be flushed.  Lexa rutherford

## 2025-04-13 NOTE — PLAN OF CARE
Problem: Discharge Planning  Goal: Discharge to home or other facility with appropriate resources  Recent Flowsheet Documentation  Taken 4/13/2025 2147 by Debby Sales RN  Discharge to home or other facility with appropriate resources:   Identify barriers to discharge with patient and caregiver   Arrange for needed discharge resources and transportation as appropriate   Identify discharge learning needs (meds, wound care, etc)   Arrange for interpreters to assist at discharge as needed   Refer to discharge planning if patient needs post-hospital services based on physician order or complex needs related to functional status, cognitive ability or social support system

## 2025-04-13 NOTE — ED NOTES
MD said okay to get lactic acid without fluid bolus being completed.  Krystyna obtaining specimen now.  Lexa rutherford

## 2025-04-13 NOTE — ED NOTES
0735 - [facility] for social work consult for rehab consideration [phone].    0903 - CB from [place], Case Management. Notified of admission in bed 214-1.

## 2025-04-13 NOTE — ED PROVIDER NOTES
tablet by mouth 2 times daily      pregabalin (LYRICA) 100 MG capsule One tab hs 1 week, 2 tabs hs 1 week, 1 tab am 2 tabs pm 90 capsule 0    nortriptyline (PAMELOR) 25 MG capsule Take 1 capsule by mouth nightly 30 capsule 0    DULoxetine (CYMBALTA) 60 MG extended release capsule Take 1 capsule by mouth daily 30 capsule 0    vitamin B-12 (CYANOCOBALAMIN) 1000 MCG tablet TAKE TWO (2) TABLETS BY MOUTH DAILY 60 tablet 2    donepezil (ARICEPT) 10 MG tablet TAKE ONE (1) TABLET BY MOUTH NIGHTLY 30 tablet 2    DULoxetine (CYMBALTA) 60 MG extended release capsule Take 1 capsule by mouth daily 30 capsule 0    diclofenac sodium (VOLTAREN) 1 % GEL Apply 2 g topically 4 times daily 60 g 0    lisinopril (PRINIVIL;ZESTRIL) 2.5 MG tablet Take 1 tablet by mouth daily 90 tablet 3    allopurinol (ZYLOPRIM) 300 MG tablet Take 1 tablet by mouth daily 90 tablet 1    atorvastatin (LIPITOR) 20 MG tablet Take 1 tablet by mouth daily      carvedilol (COREG) 3.125 MG tablet Take 1 tablet by mouth 2 times daily       Allergies   Allergen Reactions    Cephalexin Other (See Comments)    Penicillins Other (See Comments)       Nursing Notes Reviewed    Physical Exam:  Triage VS:    ED Triage Vitals [04/13/25 0155]   Encounter Vitals Group      BP (!) 144/121      Systolic BP Percentile       Diastolic BP Percentile       Pulse 56      Respirations 18      Temp 98.4 °F (36.9 °C)      Temp src       SpO2 100 %      Weight - Scale 81.6 kg (180 lb)      Height 1.753 m (5' 9\")      Head Circumference       Peak Flow       Pain Score       Pain Loc       Pain Education       Exclude from Growth Chart        My pulse ox interpretation is - normal    General appearance:  No acute distress.   Skin:  Warm. Dry.   Eye:  Extraocular movements intact.     Ears, nose, mouth and throat:  Oral mucosa moist   Neck:  Trachea midline.   Extremity:  No swelling.  Normal ROM     Heart:  Regular rate and rhythm, normal S1 & S2, no extra heart sounds.    Perfusion:

## 2025-04-13 NOTE — PROGRESS NOTES
Patient resting quietly in bed, denies complains of remains confused to situation states does not know how he got here cannot remember anything. VSC on and in room, bed alarm on. Call bell and bedside table within reach. Bed alarm on. Will continue to monitor.

## 2025-04-13 NOTE — PROGRESS NOTES
Unable to verify medications at this time, Pharmacy is closed and patients states he has about 10 medications at home that he does not take and has not taken for over 3 months.

## 2025-04-13 NOTE — FLOWSHEET NOTE
04/13/25 1430   Vital Signs   Temp 98.2 °F (36.8 °C)   Temp Source Oral   Pulse 87   Heart Rate Source Monitor   Respirations 18   /61   MAP (Calculated) 81   Pain Assessment   Pain Assessment None - Denies Pain   Opioid-Induced Sedation   POSS Score 1   Oxygen Therapy   SpO2 97 %   O2 Device None (Room air)     Lactic Acid improving at  2.6. Patient remains confused to situation. Unsteady on feet. VSS. Call bell and bedside table within reach. Bed alarm on and VSC in room and on.

## 2025-04-13 NOTE — PROGRESS NOTES
Patient remains with AMS change. Confused to situation, reoriented with short term effect. Assisted to bathroom, gait unsteady, voiding clear bertin urine. VSS. Call bell and bedside table within reach. Bed alarm on. VSC remains on and in room.

## 2025-04-13 NOTE — H&P
Hospital Medicine History & Physical    V 1.6    Date of Admission: 2025    Date of Service:  Pt seen/examined on 25      [x]Admitted to Inpatient with expected LOS greater than two midnights due to medical therapy.  []Placed in Observation status.    Chief Admission Complaint:    Altered Mental Status (Pt found on floor of apartment. Pt neighbors called as pt was screaming. Pt unsure why he was on the floor, knows name//year/place)       Presenting Admission History:      64 y.o. male who presented to [facility] with alt mental status. PT was brought in via squad after neighbors called because pt was screaming. PT was found on floor.   PMHx significant for CAD, HTN, HLD    ED course: Pt presented afebrile with stable VS normal O2 sat.  LAbs obtained and significant for LAtic of 7.5> 3.6 glucose 132 with . Elevated LFT's. Intoxicated with ETOh level of 388. Tox neg. No leukocytosis, stable H/H, urine bland, ammonia WNL     Pt was admitted under hospitalist medicine for further evaluation and treatment     On exam pt is very poor historian, sitting up in bed in NAD, he can tell me the year, month location. He has no recollection  of how he ended up in the hospital. He is unsure when and what his last meal was. He reports he has taken no medications in the last 10 months. He drinks 1.5 liter ETOh + wine every 3 days. He denies HI/SI     Assessment/Plan:      Current Principal Problem:  Altered mental state    Altered Mental status in the setting of ETOH intoxication, suspect underlying cognitive issues   - POA pt was found down at home screaming  - stable VS and labs reviewed   - head and neck CT: IMPRESSION:  1. No acute intracranial abnormality.  2. No acute osseous abnormality of the cervical spine.  3. Moderate chronic small vessel ischemic disease.  4. Atrophy.  5. Degenerative disc disease and endplate spurring at C4 through C7.  - ETOH 388 on arrival with elevated LFT  -

## 2025-04-13 NOTE — PLAN OF CARE
64-year-old male with history of CAD hypertension hyperlipidemia.  Presenting with alcohol intoxication, altered mental status.  Alcohol level at 400 on presentation.  Lactic acid level 7.5-> after hydration repeat lactate is 3.6.  CT head and CT C-spine negative.

## 2025-04-14 ENCOUNTER — APPOINTMENT (OUTPATIENT)
Dept: GENERAL RADIOLOGY | Age: 65
DRG: 897 | End: 2025-04-14
Payer: MEDICARE

## 2025-04-14 PROBLEM — M25.562 ACUTE PAIN OF LEFT KNEE: Status: ACTIVE | Noted: 2025-04-14

## 2025-04-14 LAB
ALBUMIN SERPL-MCNC: 3.4 G/DL (ref 3.4–5)
ALP SERPL-CCNC: 89 U/L (ref 40–129)
ALT SERPL-CCNC: 125 U/L (ref 10–40)
ANION GAP SERPL CALCULATED.3IONS-SCNC: 15 MMOL/L (ref 3–16)
AST SERPL-CCNC: 144 U/L (ref 15–37)
BASOPHILS # BLD: 0 K/UL (ref 0–0.2)
BASOPHILS NFR BLD: 0.5 %
BILIRUB DIRECT SERPL-MCNC: 0.6 MG/DL (ref 0–0.3)
BILIRUB INDIRECT SERPL-MCNC: 0.4 MG/DL (ref 0–1)
BILIRUB SERPL-MCNC: 1 MG/DL (ref 0–1)
BUN SERPL-MCNC: 4 MG/DL (ref 7–20)
CALCIUM SERPL-MCNC: 8.7 MG/DL (ref 8.3–10.6)
CHLORIDE SERPL-SCNC: 99 MMOL/L (ref 99–110)
CK SERPL-CCNC: 634 U/L (ref 39–308)
CO2 SERPL-SCNC: 22 MMOL/L (ref 21–32)
CREAT SERPL-MCNC: 0.7 MG/DL (ref 0.8–1.3)
CRP SERPL-MCNC: 10 MG/L (ref 0–5.1)
DEPRECATED RDW RBC AUTO: 15.2 % (ref 12.4–15.4)
EOSINOPHIL # BLD: 0 K/UL (ref 0–0.6)
EOSINOPHIL NFR BLD: 0.3 %
ERYTHROCYTE [SEDIMENTATION RATE] IN BLOOD BY WESTERGREN METHOD: 4 MM/HR (ref 0–20)
FOLATE SERPL-MCNC: 11.2 NG/ML (ref 4.78–24.2)
GFR SERPLBLD CREATININE-BSD FMLA CKD-EPI: >90 ML/MIN/{1.73_M2}
GLUCOSE SERPL-MCNC: 132 MG/DL (ref 70–99)
HCT VFR BLD AUTO: 42.5 % (ref 40.5–52.5)
HGB BLD-MCNC: 14.5 G/DL (ref 13.5–17.5)
LYMPHOCYTES # BLD: 1.4 K/UL (ref 1–5.1)
LYMPHOCYTES NFR BLD: 15.6 %
MCH RBC QN AUTO: 35.6 PG (ref 26–34)
MCHC RBC AUTO-ENTMCNC: 34.1 G/DL (ref 31–36)
MCV RBC AUTO: 104.4 FL (ref 80–100)
MONOCYTES # BLD: 1.3 K/UL (ref 0–1.3)
MONOCYTES NFR BLD: 14.1 %
NEUTROPHILS # BLD: 6.3 K/UL (ref 1.7–7.7)
NEUTROPHILS NFR BLD: 69.5 %
PLATELET # BLD AUTO: 157 K/UL (ref 135–450)
PMV BLD AUTO: 8.5 FL (ref 5–10.5)
POTASSIUM SERPL-SCNC: 3.6 MMOL/L (ref 3.5–5.1)
PROT SERPL-MCNC: 6.1 G/DL (ref 6.4–8.2)
RBC # BLD AUTO: 4.08 M/UL (ref 4.2–5.9)
SODIUM SERPL-SCNC: 136 MMOL/L (ref 136–145)
URATE SERPL-MCNC: 7.4 MG/DL (ref 3.5–7.2)
VIT B12 SERPL-MCNC: 1251 PG/ML (ref 211–911)
WBC # BLD AUTO: 9.1 K/UL (ref 4–11)

## 2025-04-14 PROCEDURE — 99232 SBSQ HOSP IP/OBS MODERATE 35: CPT | Performed by: INTERNAL MEDICINE

## 2025-04-14 PROCEDURE — 85025 COMPLETE CBC W/AUTO DIFF WBC: CPT

## 2025-04-14 PROCEDURE — 85652 RBC SED RATE AUTOMATED: CPT

## 2025-04-14 PROCEDURE — 6370000000 HC RX 637 (ALT 250 FOR IP): Performed by: INTERNAL MEDICINE

## 2025-04-14 PROCEDURE — 80048 BASIC METABOLIC PNL TOTAL CA: CPT

## 2025-04-14 PROCEDURE — 84550 ASSAY OF BLOOD/URIC ACID: CPT

## 2025-04-14 PROCEDURE — 82607 VITAMIN B-12: CPT

## 2025-04-14 PROCEDURE — 80076 HEPATIC FUNCTION PANEL: CPT

## 2025-04-14 PROCEDURE — 82550 ASSAY OF CK (CPK): CPT

## 2025-04-14 PROCEDURE — 82746 ASSAY OF FOLIC ACID SERUM: CPT

## 2025-04-14 PROCEDURE — 97530 THERAPEUTIC ACTIVITIES: CPT

## 2025-04-14 PROCEDURE — 36415 COLL VENOUS BLD VENIPUNCTURE: CPT

## 2025-04-14 PROCEDURE — 1200000000 HC SEMI PRIVATE

## 2025-04-14 PROCEDURE — 2580000003 HC RX 258: Performed by: INTERNAL MEDICINE

## 2025-04-14 PROCEDURE — 97165 OT EVAL LOW COMPLEX 30 MIN: CPT

## 2025-04-14 PROCEDURE — 6370000000 HC RX 637 (ALT 250 FOR IP): Performed by: STUDENT IN AN ORGANIZED HEALTH CARE EDUCATION/TRAINING PROGRAM

## 2025-04-14 PROCEDURE — 86140 C-REACTIVE PROTEIN: CPT

## 2025-04-14 PROCEDURE — 73562 X-RAY EXAM OF KNEE 3: CPT

## 2025-04-14 RX ORDER — IBUPROFEN 400 MG/1
400 TABLET, FILM COATED ORAL EVERY 6 HOURS PRN
Status: DISCONTINUED | OUTPATIENT
Start: 2025-04-14 | End: 2025-04-15 | Stop reason: HOSPADM

## 2025-04-14 RX ORDER — OXYCODONE HYDROCHLORIDE 5 MG/1
2.5 TABLET ORAL ONCE
Refills: 0 | Status: COMPLETED | OUTPATIENT
Start: 2025-04-14 | End: 2025-04-14

## 2025-04-14 RX ORDER — PANTOPRAZOLE SODIUM 40 MG/1
40 TABLET, DELAYED RELEASE ORAL
Status: DISCONTINUED | OUTPATIENT
Start: 2025-04-14 | End: 2025-04-15 | Stop reason: HOSPADM

## 2025-04-14 RX ADMIN — LISINOPRIL 2.5 MG: 5 TABLET ORAL at 09:52

## 2025-04-14 RX ADMIN — CARVEDILOL 3.12 MG: 3.12 TABLET, FILM COATED ORAL at 21:40

## 2025-04-14 RX ADMIN — SODIUM CHLORIDE, SODIUM LACTATE, POTASSIUM CHLORIDE, AND CALCIUM CHLORIDE: .6; .31; .03; .02 INJECTION, SOLUTION INTRAVENOUS at 16:23

## 2025-04-14 RX ADMIN — ASPIRIN 81 MG: 81 TABLET, COATED ORAL at 09:53

## 2025-04-14 RX ADMIN — SODIUM CHLORIDE, SODIUM LACTATE, POTASSIUM CHLORIDE, AND CALCIUM CHLORIDE: .6; .31; .03; .02 INJECTION, SOLUTION INTRAVENOUS at 02:47

## 2025-04-14 RX ADMIN — ACETAMINOPHEN 650 MG: 325 TABLET ORAL at 21:40

## 2025-04-14 RX ADMIN — IBUPROFEN 400 MG: 400 TABLET, FILM COATED ORAL at 11:45

## 2025-04-14 RX ADMIN — ATORVASTATIN CALCIUM 20 MG: 10 TABLET, FILM COATED ORAL at 09:52

## 2025-04-14 RX ADMIN — PANTOPRAZOLE SODIUM 40 MG: 40 TABLET, DELAYED RELEASE ORAL at 12:50

## 2025-04-14 RX ADMIN — DONEPEZIL HYDROCHLORIDE 10 MG: 5 TABLET, FILM COATED ORAL at 21:40

## 2025-04-14 RX ADMIN — OXYCODONE HYDROCHLORIDE 2.5 MG: 5 TABLET ORAL at 04:16

## 2025-04-14 RX ADMIN — FOLIC ACID 1 MG: 1 TABLET ORAL at 09:52

## 2025-04-14 RX ADMIN — Medication 100 MG: at 09:53

## 2025-04-14 RX ADMIN — DICLOFENAC SODIUM 4 G: 10 GEL TOPICAL at 12:50

## 2025-04-14 RX ADMIN — ALLOPURINOL 300 MG: 300 TABLET ORAL at 09:52

## 2025-04-14 RX ADMIN — Medication 500 MCG: at 09:56

## 2025-04-14 RX ADMIN — CARVEDILOL 3.12 MG: 3.12 TABLET, FILM COATED ORAL at 09:52

## 2025-04-14 ASSESSMENT — PAIN DESCRIPTION - LOCATION
LOCATION: LEG;KNEE
LOCATION: LEG
LOCATION: LEG
LOCATION: FOOT;KNEE

## 2025-04-14 ASSESSMENT — PAIN DESCRIPTION - ONSET: ONSET: ON-GOING

## 2025-04-14 ASSESSMENT — PAIN DESCRIPTION - DESCRIPTORS
DESCRIPTORS: POUNDING
DESCRIPTORS: STABBING
DESCRIPTORS: SQUEEZING
DESCRIPTORS: POUNDING

## 2025-04-14 ASSESSMENT — PAIN DESCRIPTION - ORIENTATION
ORIENTATION: LEFT
ORIENTATION: RIGHT;LEFT
ORIENTATION: LEFT
ORIENTATION: MID;LOWER

## 2025-04-14 ASSESSMENT — PAIN SCALES - GENERAL
PAINLEVEL_OUTOF10: 10
PAINLEVEL_OUTOF10: 5
PAINLEVEL_OUTOF10: 10
PAINLEVEL_OUTOF10: 10

## 2025-04-14 ASSESSMENT — PAIN - FUNCTIONAL ASSESSMENT
PAIN_FUNCTIONAL_ASSESSMENT: PREVENTS OR INTERFERES SOME ACTIVE ACTIVITIES AND ADLS
PAIN_FUNCTIONAL_ASSESSMENT: ACTIVITIES ARE NOT PREVENTED

## 2025-04-14 ASSESSMENT — PAIN DESCRIPTION - PAIN TYPE: TYPE: ACUTE PAIN

## 2025-04-14 ASSESSMENT — PAIN DESCRIPTION - FREQUENCY: FREQUENCY: CONTINUOUS

## 2025-04-14 NOTE — PROGRESS NOTES
Pt has scored a 3 on CIWA scale.  He is not showing signs of withdrawal.  He continues to moan and groan about the pain in his legs, he has repeatedly called and asked anyone who answers for pain medicine.  He received a dose of tylenol and a 1 time dose of roxicodone.  He is not comprehending that I cannot give him any more pain medication.  He has repeated himself several times during the night and has asked when the doctor will be in.  RN explained that repeatedly calling and asking different staff members for pain medicine will not make the pain medication available.

## 2025-04-14 NOTE — PROGRESS NOTES
Physical Therapy    Will hold PT evaluation until tomorrow in hopes of improved pain control. OT evaluation earlier today was quite limited.    Reina Roberson, PT, DPT

## 2025-04-14 NOTE — PROGRESS NOTES
Occupational Therapy  Order received and chart reviewed. Patient is having severe knee pain at this time. Patient has a pending knee xray. OT/PT will follow up later today as appropriate and as patient tolerates.       Evelyn Raymundo, OTR/L #[id number]

## 2025-04-14 NOTE — PROGRESS NOTES
Pt is very verbal and being very apprehensive about needles and receiving pain meds.  Pt is very unsteady.

## 2025-04-14 NOTE — CARE COORDINATION
Case Management Assessment  Initial Evaluation    Date/Time of Evaluation: 4/14/2025 2:18 PM  Assessment Completed by: Flavia Galeano RN    If patient is discharged prior to next notation, then this note serves as note for discharge by case management.    Patient Name: Marvin Mc                   YOB: 1960  Diagnosis: Altered mental state [R41.82]                   Date / Time: 4/13/2025  1:42 AM    Patient Admission Status: Inpatient   Readmission Risk (Low < 19, Mod (19-27), High > 27): Readmission Risk Score: 11.1    Current PCP: No primary care provider on file.  PCP verified by CM? Yes (KAREL Domínguez)    Chart Reviewed: No      History Provided by: Patient  Patient Orientation: Alert and Oriented    Patient Cognition: Alert    Hospitalization in the last 30 days (Readmission):  No    If yes, Readmission Assessment in CM Navigator will be completed.    Advance Directives:      Code Status: Full Code   Patient's Primary Decision Maker is: Legal Next of Kin      Discharge Planning:    Patient lives with: Alone Type of Home: Apartment  Primary Care Giver: Self  Patient Support Systems include: Family Members   Current Financial resources: Other (Comment) ([employer] Medicare/Medicaid)  Current community resources: None  Current services prior to admission: None            Current DME:              Type of Home Care services:  None    ADLS  Prior functional level: Independent in ADLs/IADLs  Current functional level: Independent in ADLs/IADLs    PT AM-PAC:   /24  OT AM-PAC:   /24    Family can provide assistance at DC: No  Would you like Case Management to discuss the discharge plan with any other family members/significant others, and if so, who? No  Plans to Return to Present Housing: Yes  Other Identified Issues/Barriers to RETURNING to current housing: None  Potential Assistance needed at discharge: Home Care            Potential DME:    Patient expects to discharge to: Apartment  Plan for

## 2025-04-14 NOTE — PROGRESS NOTES
Shift report given to Edwin at bedside. Patient is stable. All end of shift needs have been met. No further assistance needed at this time.

## 2025-04-14 NOTE — PROGRESS NOTES
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 Pt neighbors called as pt was screaming. Pt unsure why he was on the floor, knows name//year/place).   PMHx significant for CAD, HTN, HLD     ETOH use disorder:   - presented intoxicated with fall     Lactic acidosis with early rhabdomyolysis   - suspect from ETOH intoxication and being down    Gout: cont allopurinol     Beg Bugs: environmental isolation     Preadmission Environment:   Pt. Lives     alone  Home environment:    apartment -2nd floor  Steps to enter first floor:   12 steps to enter  Laundry:     Unknown  Bathroom:     tub/shower unit  Pt sleeps in a:    Flat bed  Equipment owned:    none    Preadmission Status:  Pt. Able to drive:    No  Pt. Fully independent with ADLs:  Yes  Pt. Required assistance for:   Independent PTA  Pt. independent for functional transfers and utilized No Device for mobility in home and No Device out in community  History of falls:   Yes prior to admission   Home Health Services:  None    AM-PAC Score: AM-PAC Inpatient Daily Activity Raw Score: 18     Subjective:  Patient lying reclined in bed with no family present.   Pt agreeable to this OT session.     Cognition:

## 2025-04-14 NOTE — PROGRESS NOTES
[facility]    Daily Progress Note for 2025 10:58 AM 0214/0214-01  Marvin Mc : 1960 Age: 64 y.o. Sex: male  Length of Stay:  1    Interval History:      CC: F/U Altered Mental Status (Pt found on floor of apartment.  Pt neighbors called as pt was screaming.  Pt unsure why he was on the floor, knows name//year/place)    Subjective:       Patient reports left knee pain and swelling. He reports this has been going on he thinks for a few months. At home he takes aleve. No trauma. Stabbing pain.     Objective:     Vitals:    25 0446 25 0600 25 0653 25 0946   BP:   (!) 161/86 110/77   Pulse:   96 (!) 108   Resp: 16   16   Temp:    98.8 °F (37.1 °C)   TempSrc:    Oral   SpO2:       Weight:  80 kg (176 lb 6.4 oz)     Height:              Intake/Output Summary (Last 24 hours) at 2025 1058  Last data filed at 2025 0844  Gross per 24 hour   Intake 1816.52 ml   Output 550 ml   Net 1266.52 ml     Body mass index is 26.82 kg/m².    Physical Exam:  General: Cooperative, pleasant/Ill appearing, on  Nasal cannula  HEENT:  Head: normocephalic, atraumatic, anicteric sclera, clear conjunctiva  Neck: Normal size, Jugular venous pulsations: normal  Respiratory:unlabored breathing, clear to auscultation with no crackles, wheezes rhonchi  Heart: Regular rate and rhythm, S1, S2-normal, No murmurs  Abdomen: soft, nondistended, nontender, normoactive bowel sounds,  Neurological/Psych: Alert and oriented times three, no focal neurological deficits, Mood and affect appropriate.  Skin: No obvious rashes    Extremities:  no edema, Pedal pulses 2+ bilaterally    Scheduled Medications:  pantoprazole, 40 mg, QAM AC  sodium chloride flush, 5-40 mL, 2 times per day  thiamine, 100 mg, Daily  folic acid, 1 mg, Daily  allopurinol, 300 mg, Daily  aspirin, 81 mg, Daily  atorvastatin, 20 mg, Daily  carvedilol, 3.125 mg, BID  vitamin B-12, 500 mcg, Daily  donepezil, 10 mg,

## 2025-04-14 NOTE — PLAN OF CARE
Problem: Discharge Planning  Goal: Discharge to home or other facility with appropriate resources  Outcome: Progressing  Flowsheets (Taken 4/13/2025 0948 by Debby Sales, RN)  Discharge to home or other facility with appropriate resources:   Identify barriers to discharge with patient and caregiver   Arrange for needed discharge resources and transportation as appropriate   Identify discharge learning needs (meds, wound care, etc)   Arrange for interpreters to assist at discharge as needed   Refer to discharge planning if patient needs post-hospital services based on physician order or complex needs related to functional status, cognitive ability or social support system     Problem: Safety - Adult  Goal: Free from fall injury  Outcome: Progressing     Problem: Confusion  Goal: Confusion, delirium, dementia, or psychosis is improved or at baseline  Description: INTERVENTIONS:1. Assess for possible contributors to thought disturbance, including medications, impaired vision or hearing, underlying metabolic abnormalities, dehydration, psychiatric diagnoses, and notify attending LIP2. [place] high risk fall precautions, as indicated3. Provide frequent short contacts to provide reality reorientation, refocusing and direction4. Decrease environmental stimuli, including noise as appropriate5. Monitor and intervene to maintain adequate nutrition, hydration, elimination, sleep and activity6. If unable to ensure safety without constant attention obtain sitter and review sitter guidelines with assigned personnel7. Initiate Psychosocial CNS and Spiritual Care consult, as indicated  INTERVENTIONS:1. Assess for possible contributors to thought disturbance, including medications, impaired vision or hearing, underlying metabolic abnormalities, dehydration, psychiatric diagnoses, and notify attending LIP2. [place] high risk fall precautions, as indicated3. Provide frequent short contacts to provide reality

## 2025-04-14 NOTE — FLOWSHEET NOTE
04/13/25 2017   Vital Signs   Temp 98.3 °F (36.8 °C)   Temp Source Oral   Pulse 92   Heart Rate Source Monitor   Respirations 18   BP (!) 162/87   MAP (Calculated) 112   BP Location Right upper arm   BP Method Automatic   Patient Position Semi fowlers   Pain Assessment   Pain Assessment 0-10   Pain Level 9   Patient's Stated Pain Goal 5   Pain Location Leg   Pain Orientation Right;Left   Pain Descriptors Squeezing   Functional Pain Assessment Prevents or interferes some active activities and ADLs   Pain Type Chronic pain   Non-Pharmaceutical Pain Intervention(s) Rest   Opioid-Induced Sedation   POSS Score 1   RASS   [place] Agitation Sedation Scale (RASS) 0   Oxygen Therapy   SpO2 96 %   O2 Device None (Room air)     Shift assessment complete.  Patient is alert and oriented.  Vital signs are stable.  Respirations are even and easy, no signs or symptoms of distress.  Pt denies any needs at this time.  Call light within reach.

## 2025-04-14 NOTE — PROGRESS NOTES
RN spoke with Pharmacy regarding giving a dose of tylenol to patient for leg pain.  After reviewing chart, pharmacist agrees it would be ok for a dose.

## 2025-04-14 NOTE — CONSULTS
disease and endplate spurring at C4 through C7.         XR KNEE LEFT (3 VIEWS)    (Results Pending)          IMPRESSION/RECOMMENDATIONS:    Assessment: left knee effusion and pain.     Plan:  1) patient has no white count remains afebrile. With an elevated uric acid, minimally elevated CRP and normal sed rate believe low likelihood for septic arthritis.   2) discussed with the patient that there is a need to aspirate the joint to rule out septic arthritis vs gout flare as patient has a history of gout and an uncontrolled use of ETOH makes gout flare more likely the cause of acute left knee pain.  HOWEVER patient refused out right to allow me to aspirate the knee.   3) if the patient is unwilling to move forward with aspiration ortho will step back and allow for medical management for possible gout flare with recommendations for short course of steroid and or colchicine if appropriate per the Internal medicine team   4) discussed with the patient the risks associated with not aspirating the if septic joint is present including destruction of joint, infection to the bone, sepsis loss of limb and even death if not managed patient still declined aspiration. If patients condition worsens or changes and he is willing to go through with aspiration then we would be happy to re engage with the case.   5) ortho will sign off at this time.   6) called and discussed case with Dr. Phil Anthony and he is in agreement with current management plan.   7) reviewed xrays of the left knee noted no fracture or dislocation. Noted effusion to the knee. Noted OA development to the knee.   8) recommend working with PT/OT for motion and ambulation patient can be WBAT with use of walker as needed on the LLE       Thank you for the opportunity to consult on this patient.    Electronically signed by LYUDMILA Romo on 4/14/2025 at 12:32 PM

## 2025-04-15 VITALS
WEIGHT: 181 LBS | TEMPERATURE: 98.1 F | BODY MASS INDEX: 27.43 KG/M2 | OXYGEN SATURATION: 98 % | RESPIRATION RATE: 16 BRPM | HEART RATE: 74 BPM | HEIGHT: 68 IN | DIASTOLIC BLOOD PRESSURE: 86 MMHG | SYSTOLIC BLOOD PRESSURE: 147 MMHG

## 2025-04-15 PROCEDURE — 6370000000 HC RX 637 (ALT 250 FOR IP): Performed by: INTERNAL MEDICINE

## 2025-04-15 PROCEDURE — 2580000003 HC RX 258: Performed by: INTERNAL MEDICINE

## 2025-04-15 PROCEDURE — 97530 THERAPEUTIC ACTIVITIES: CPT

## 2025-04-15 PROCEDURE — 99239 HOSP IP/OBS DSCHRG MGMT >30: CPT | Performed by: INTERNAL MEDICINE

## 2025-04-15 PROCEDURE — 97162 PT EVAL MOD COMPLEX 30 MIN: CPT

## 2025-04-15 PROCEDURE — 97116 GAIT TRAINING THERAPY: CPT

## 2025-04-15 RX ORDER — FOLIC ACID 1 MG/1
1 TABLET ORAL DAILY
Qty: 30 TABLET | Refills: 0 | Status: SHIPPED | OUTPATIENT
Start: 2025-04-16

## 2025-04-15 RX ORDER — LANOLIN ALCOHOL/MO/W.PET/CERES
100 CREAM (GRAM) TOPICAL DAILY
Qty: 30 TABLET | Refills: 0 | Status: SHIPPED | OUTPATIENT
Start: 2025-04-16

## 2025-04-15 RX ADMIN — Medication 100 MG: at 08:56

## 2025-04-15 RX ADMIN — IBUPROFEN 400 MG: 400 TABLET, FILM COATED ORAL at 02:05

## 2025-04-15 RX ADMIN — ACETAMINOPHEN 650 MG: 325 TABLET ORAL at 05:36

## 2025-04-15 RX ADMIN — IBUPROFEN 400 MG: 400 TABLET, FILM COATED ORAL at 08:55

## 2025-04-15 RX ADMIN — PANTOPRAZOLE SODIUM 40 MG: 40 TABLET, DELAYED RELEASE ORAL at 05:36

## 2025-04-15 RX ADMIN — LISINOPRIL 2.5 MG: 5 TABLET ORAL at 08:58

## 2025-04-15 RX ADMIN — ALLOPURINOL 300 MG: 300 TABLET ORAL at 08:56

## 2025-04-15 RX ADMIN — FOLIC ACID 1 MG: 1 TABLET ORAL at 08:56

## 2025-04-15 RX ADMIN — ASPIRIN 81 MG: 81 TABLET, COATED ORAL at 08:56

## 2025-04-15 RX ADMIN — SODIUM CHLORIDE, SODIUM LACTATE, POTASSIUM CHLORIDE, AND CALCIUM CHLORIDE: .6; .31; .03; .02 INJECTION, SOLUTION INTRAVENOUS at 05:51

## 2025-04-15 RX ADMIN — CARVEDILOL 3.12 MG: 3.12 TABLET, FILM COATED ORAL at 08:55

## 2025-04-15 RX ADMIN — ATORVASTATIN CALCIUM 20 MG: 10 TABLET, FILM COATED ORAL at 08:56

## 2025-04-15 ASSESSMENT — PAIN DESCRIPTION - DESCRIPTORS
DESCRIPTORS: ACHING;DISCOMFORT;POUNDING
DESCRIPTORS: ACHING;POUNDING
DESCRIPTORS: POUNDING

## 2025-04-15 ASSESSMENT — PAIN DESCRIPTION - LOCATION
LOCATION: LEG;KNEE
LOCATION: KNEE
LOCATION: LEG;KNEE

## 2025-04-15 ASSESSMENT — PAIN DESCRIPTION - FREQUENCY
FREQUENCY: CONTINUOUS
FREQUENCY: CONTINUOUS

## 2025-04-15 ASSESSMENT — PAIN - FUNCTIONAL ASSESSMENT
PAIN_FUNCTIONAL_ASSESSMENT: PREVENTS OR INTERFERES SOME ACTIVE ACTIVITIES AND ADLS
PAIN_FUNCTIONAL_ASSESSMENT: PREVENTS OR INTERFERES SOME ACTIVE ACTIVITIES AND ADLS
PAIN_FUNCTIONAL_ASSESSMENT: ACTIVITIES ARE NOT PREVENTED

## 2025-04-15 ASSESSMENT — PAIN DESCRIPTION - ORIENTATION
ORIENTATION: LEFT

## 2025-04-15 ASSESSMENT — PAIN DESCRIPTION - PAIN TYPE
TYPE: ACUTE PAIN
TYPE: ACUTE PAIN

## 2025-04-15 ASSESSMENT — PAIN SCALES - GENERAL
PAINLEVEL_OUTOF10: 5
PAINLEVEL_OUTOF10: 7
PAINLEVEL_OUTOF10: 10

## 2025-04-15 ASSESSMENT — PAIN DESCRIPTION - ONSET
ONSET: ON-GOING
ONSET: ON-GOING

## 2025-04-15 NOTE — PROGRESS NOTES
PM Assessment completed. Pt does not express any pain or discomfort at this time. Respirations are even & easy. No complaints voiced. Pt denies needs at this time. SR up x 2, and bed in low position. Call light is within reach.    Bedside Mobility Assessment Tool (BMAT):     Assessment Level 1- Sit and Shake    1. From a semi-reclined position, ask patient to sit up and rotate to a seated position at the side of the bed. Can use the bedrail.    2. Ask patient to reach out and grab your hand and shake making sure patient reaches across his/her midline.   Pass- Patient is able to come to a seated position, maintain core strength. Maintains seated balance while reaching across midline. Move on to Assessment Level 2.     Assessment Level 2- Stretch and Point   1. With patient in seated position at the side of the bed, have patient place both feet on the floor (or stool) with knees no higher than hips.    2. Ask patient to stretch one leg and straighten the knee, then bend the ankle/flex and point the toes. If appropriate, repeat with the other leg.   Pass- Patient is able to demonstrate appropriate quad strength on intended weight bearing limb(s). Move onto Assessment Level 3.     Assessment Level 3- Stand   1. Ask patient to elevate off the bed or chair (seated to standing) using an assistive device (cane, bedrail).    2. Patient should be able to raise buttocks off be and hold for a count of five. May repeat once.   Pass- Patient maintains standing stability for at least 5 seconds, proceed to assessment level 4.    Assessment Level 4- Walk   1. Ask patient to march in place at bedside.    2. Then ask patient to advance step and return each foot. Some medical conditions may render a patient from stepping backwards, use your best clinical judgement.   Fail- Patient not able to complete tasks OR requires use of assistive device. Patient is MOBILITY LEVEL 3.       Mobility Level- 3

## 2025-04-15 NOTE — PROGRESS NOTES
Patient discharged to home in stable condition. Three scripts sent with the patient to have filled.

## 2025-04-15 NOTE — DISCHARGE SUMMARY
Hospital Medicine Discharge Summary    Patient: Marvin Mc     Gender: male  : 1960   Age: 64 y.o.  MRN: [id number]    Admitting Physician: Yazmin Harrington MD  Discharge Physician: Tisha West,     Code Status: Full Code     Admit Date: 2025   Discharge Date: 4/15/2025      Discharge Diagnoses:    Active Hospital Problems    Diagnosis Date Noted    Acute pain of left knee [M25.562] 2025    Altered mental state [R41.82] 2025    Alcohol withdrawal syndrome without complication (HCC) [F10.930] 2025    Lactic acidosis [E87.20] 2025    Non-traumatic rhabdomyolysis [M62.82] 2025    Hypertension [I10]     Hyperlipidemia [E78.5]     CAD, multiple vessel [I25.10] 2023     Condition at Discharge: Stable. Strongly recommended patient to have further assessment for his knee but he declines multiple times and he is requesting to go home now. Will include follow-up resources from him if he is willing to undergo further testing and treatment. Encouraged to return at any time.     Hospital Course:     64 y.o. male who presented to [facility] with alt mental status. PT was brought in via squad after neighbors called because pt was screaming. PT was found on floor.     Lactic acidosis with early rhabdomyolysis   - suspect from ETOH intoxication and being down  - crt stable, Lactic 7.5 > 3.6 no suspicion of infection sepsis   - cont IVF, monitor renal and repeat CPK - approx 400     Knee pain and swelling  - xrays ordered - shows tricompartmental osteoarthritis  - could be due to underlying arthritis, also possible gout  - Ortho consulted, discussed several times regarding further work-up but he declines to do this. Described possible adverse events such as infection, sepsis, loss of limb and even death.   - voltaren gel     ETOH use disorder:   - presented intoxicated with fall   - cessation eduction   - CIWA   - add detox labs      Transaminitis suspect from ETOH

## 2025-04-15 NOTE — PLAN OF CARE
Problem: Discharge Planning  Goal: Discharge to home or other facility with appropriate resources  Outcome: Progressing     Problem: Safety - Adult  Goal: Free from fall injury  Outcome: Progressing     Problem: Confusion  Goal: Confusion, delirium, dementia, or psychosis is improved or at baseline  Description: INTERVENTIONS:1. Assess for possible contributors to thought disturbance, including medications, impaired vision or hearing, underlying metabolic abnormalities, dehydration, psychiatric diagnoses, and notify attending LIP2. [place] high risk fall precautions, as indicated3. Provide frequent short contacts to provide reality reorientation, refocusing and direction4. Decrease environmental stimuli, including noise as appropriate5. Monitor and intervene to maintain adequate nutrition, hydration, elimination, sleep and activity6. If unable to ensure safety without constant attention obtain sitter and review sitter guidelines with assigned personnel7. Initiate Psychosocial CNS and Spiritual Care consult, as indicated  INTERVENTIONS:1. Assess for possible contributors to thought disturbance, including medications, impaired vision or hearing, underlying metabolic abnormalities, dehydration, psychiatric diagnoses, and notify attending LIP2. [place] high risk fall precautions, as indicated3. Provide frequent short contacts to provide reality reorientation, refocusing and direction4. Decrease environmental stimuli, including noise as appropriate5. Monitor and intervene to maintain adequate nutrition, hydration, elimination, sleep and activity6. If unable to ensure safety without constant attention obtain sitter and review sitter guidelines with assigned personnel7. Initiate Psychosocial CNS and Spiritual Care consult, as indicated  INTERVENTIONS:1. Assess for possible contributors to thought disturbance, including medications, impaired vision or hearing, underlying metabolic abnormalities, dehydration,

## 2025-04-15 NOTE — PROGRESS NOTES
Transferred care to EDUAR Pineda. Face to face bedside report given, no need voiced at this time. Pt in bed with eyes closed. No signs of distress noted.  Call light within reach.   Denies needs.      Hemostasis: Aluminum Chloride

## 2025-04-15 NOTE — PROGRESS NOTES
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        Discharge Recommendations: SNF - pt refusing, wants home. Ideally would have 24/7 initially but doesn't appear that he has this. Family is local but will not be staying with him. Home PT if he's willing.   DME needs for discharge: RW       Therapy recommendation for EMS Transport: can transport by wheelchair    Therapy recommendations for staff:   Assist of 1 for ambulation with use of rolling walker (RW) and gait belt within room    History of Present Illness:   Per H&P: \"64 y.o. male who presented to [facility] with alt mental status. PT was brought in via squad after neighbors called because pt was screaming. PT was found on floor.   PMHx significant for CAD, HTN, HLD.\"  Ortho consult completed by Masoud Lovelace PA-C on 4/14/25:   \"Assessment: left knee effusion and pain.      Plan:  1) patient has no white count remains afebrile. With an elevated uric acid, minimally elevated CRP and normal sed rate believe low likelihood for septic arthritis.   2) discussed with the patient that there is a need to aspirate the joint to rule out septic arthritis vs gout flare as patient has a history of gout and an uncontrolled use of ETOH makes gout flare more likely the cause of acute left knee pain.  HOWEVER patient refused out right to allow me to aspirate the knee.   3) if the patient is unwilling to move forward with aspiration ortho

## 2025-04-15 NOTE — DISCHARGE INSTRUCTIONS
If any worsening pain, fevers, or other new or worsening symptoms please seek medical care right away.

## 2025-04-15 NOTE — PROGRESS NOTES
Pt medicated for pain 5/10 in left leg.  Medicated with PRN ibuprofen.  See MAR and pain flow sheet.  No further assistance needed at this time. Will continue to monitor.

## 2025-04-15 NOTE — PROGRESS NOTES
Pt medicated for pain 10/10 in left leg.  Medicated with PRN Ibuprofen .  See MAR and pain flow sheet.  No further assistance needed at this time. Will continue to monitor

## 2025-04-15 NOTE — PLAN OF CARE
Problem: Discharge Planning  Goal: Discharge to home or other facility with appropriate resources  4/15/2025 1215 by Edwin Camilo RN  Outcome: Adequate for Discharge  4/14/2025 2330 by Cynthia Mcfarland RN  Outcome: Progressing  Flowsheets (Taken 4/14/2025 2130)  Discharge to home or other facility with appropriate resources: Identify barriers to discharge with patient and caregiver     Problem: Safety - Adult  Goal: Free from fall injury  4/15/2025 1215 by Edwin Camilo RN  Outcome: Adequate for Discharge  4/14/2025 2330 by Cynthia Mcfarland RN  Outcome: Progressing     Problem: Confusion  Goal: Confusion, delirium, dementia, or psychosis is improved or at baseline  Description: INTERVENTIONS:1. Assess for possible contributors to thought disturbance, including medications, impaired vision or hearing, underlying metabolic abnormalities, dehydration, psychiatric diagnoses, and notify attending LIP2. [place] high risk fall precautions, as indicated3. Provide frequent short contacts to provide reality reorientation, refocusing and direction4. Decrease environmental stimuli, including noise as appropriate5. Monitor and intervene to maintain adequate nutrition, hydration, elimination, sleep and activity6. If unable to ensure safety without constant attention obtain sitter and review sitter guidelines with assigned personnel7. Initiate Psychosocial CNS and Spiritual Care consult, as indicated  INTERVENTIONS:1. Assess for possible contributors to thought disturbance, including medications, impaired vision or hearing, underlying metabolic abnormalities, dehydration, psychiatric diagnoses, and notify attending LIP2. [place] high risk fall precautions, as indicated3. Provide frequent short contacts to provide reality reorientation, refocusing and direction4. Decrease environmental stimuli, including noise as appropriate5. Monitor and intervene to maintain adequate nutrition, hydration, elimination, sleep and

## 2025-04-15 NOTE — DISCHARGE INSTR - DIET

## 2025-04-15 NOTE — CARE COORDINATION
CASE MANAGEMENT DISCHARGE SUMMARY      Discharge to: home    IMM given: 4/15/2025 -RENU delivered second IMM to patient. Verbal explanation provided of 4 hours to review notice. Patient voiced understanding and is agreeable to discharge.      Transportation:      Family/car: sister       Confirmed discharge plan with:     Patient: yes     Family: no - he can call himself     RN name: Edwin WITT    Note: Discharging nurse to complete CINDY, reconcile AVS, and place final copy with patient's discharge packet. RN to ensure that written prescriptions for  Level II medications are sent with patient to the facility as per protocol.

## 2025-04-15 NOTE — PLAN OF CARE
Problem: Discharge Planning  Goal: Discharge to home or other facility with appropriate resources  4/14/2025 2330 by Cynthia Mcfarland RN  Outcome: Progressing  Flowsheets (Taken 4/14/2025 2130)  Discharge to home or other facility with appropriate resources: Identify barriers to discharge with patient and caregiver  4/14/2025 2033 by Edwin Camilo RN  Outcome: Progressing     Problem: Safety - Adult  Goal: Free from fall injury  4/14/2025 2330 by Cynthia Mcfarland RN  Outcome: Progressing  4/14/2025 2033 by Edwin Camilo RN  Outcome: Progressing     Problem: Confusion  Goal: Confusion, delirium, dementia, or psychosis is improved or at baseline  Description: INTERVENTIONS:1. Assess for possible contributors to thought disturbance, including medications, impaired vision or hearing, underlying metabolic abnormalities, dehydration, psychiatric diagnoses, and notify attending LIP2. [place] high risk fall precautions, as indicated3. Provide frequent short contacts to provide reality reorientation, refocusing and direction4. Decrease environmental stimuli, including noise as appropriate5. Monitor and intervene to maintain adequate nutrition, hydration, elimination, sleep and activity6. If unable to ensure safety without constant attention obtain sitter and review sitter guidelines with assigned personnel7. Initiate Psychosocial CNS and Spiritual Care consult, as indicated  INTERVENTIONS:1. Assess for possible contributors to thought disturbance, including medications, impaired vision or hearing, underlying metabolic abnormalities, dehydration, psychiatric diagnoses, and notify attending LIP2. [place] high risk fall precautions, as indicated3. Provide frequent short contacts to provide reality reorientation, refocusing and direction4. Decrease environmental stimuli, including noise as appropriate5. Monitor and intervene to maintain adequate nutrition, hydration, elimination, sleep and activity6. If unable to

## (undated) DEVICE — TRAP POLYP ETRAP

## (undated) DEVICE — SNARE VASC L240CM LOOP W10MM SHTH DIA2.4MM RND STIFF CLD

## (undated) DEVICE — ELECTRODE,RADIOTRANSLUCENT,FOAM,3PK: Brand: MEDLINE

## (undated) DEVICE — ENDO CARRY-ON PROCEDURE KIT INCLUDES SUCTION TUBING, LUBRICANT, GAUZE, BIOHAZARD STICKER, TRANSPORT PAD AND INTERCEPT BEDSIDE KIT.: Brand: ENDO CARRY-ON PROCEDURE KIT